# Patient Record
Sex: MALE | Race: WHITE | NOT HISPANIC OR LATINO | Employment: FULL TIME | URBAN - NONMETROPOLITAN AREA
[De-identification: names, ages, dates, MRNs, and addresses within clinical notes are randomized per-mention and may not be internally consistent; named-entity substitution may affect disease eponyms.]

---

## 2017-01-04 ENCOUNTER — CLINICAL SUPPORT (OUTPATIENT)
Dept: PULMONOLOGY | Facility: CLINIC | Age: 57
End: 2017-01-04

## 2017-01-04 DIAGNOSIS — J30.2 SEASONAL ALLERGIC RHINITIS, UNSPECIFIED ALLERGIC RHINITIS TRIGGER: Primary | ICD-10-CM

## 2017-01-04 PROCEDURE — 95115 IMMUNOTHERAPY ONE INJECTION: CPT | Performed by: INTERNAL MEDICINE

## 2017-01-10 ENCOUNTER — CLINICAL SUPPORT (OUTPATIENT)
Dept: PULMONOLOGY | Facility: CLINIC | Age: 57
End: 2017-01-10

## 2017-01-10 DIAGNOSIS — J30.89 ENVIRONMENTAL AND SEASONAL ALLERGIES: Primary | ICD-10-CM

## 2017-01-10 PROCEDURE — 95115 IMMUNOTHERAPY ONE INJECTION: CPT | Performed by: INTERNAL MEDICINE

## 2017-01-23 ENCOUNTER — CLINICAL SUPPORT (OUTPATIENT)
Dept: PULMONOLOGY | Facility: CLINIC | Age: 57
End: 2017-01-23

## 2017-01-23 DIAGNOSIS — J30.89 ENVIRONMENTAL AND SEASONAL ALLERGIES: Primary | ICD-10-CM

## 2017-01-23 PROCEDURE — 95115 IMMUNOTHERAPY ONE INJECTION: CPT | Performed by: INTERNAL MEDICINE

## 2017-02-07 ENCOUNTER — CLINICAL SUPPORT (OUTPATIENT)
Dept: PULMONOLOGY | Facility: CLINIC | Age: 57
End: 2017-02-07

## 2017-02-07 DIAGNOSIS — J30.89 ENVIRONMENTAL AND SEASONAL ALLERGIES: Primary | ICD-10-CM

## 2017-02-07 PROCEDURE — 95115 IMMUNOTHERAPY ONE INJECTION: CPT | Performed by: INTERNAL MEDICINE

## 2017-03-08 ENCOUNTER — CLINICAL SUPPORT (OUTPATIENT)
Dept: PULMONOLOGY | Facility: CLINIC | Age: 57
End: 2017-03-08

## 2017-03-08 DIAGNOSIS — J30.89 ENVIRONMENTAL AND SEASONAL ALLERGIES: Primary | ICD-10-CM

## 2017-03-08 PROCEDURE — 95115 IMMUNOTHERAPY ONE INJECTION: CPT | Performed by: INTERNAL MEDICINE

## 2017-03-14 ENCOUNTER — CLINICAL SUPPORT (OUTPATIENT)
Dept: PULMONOLOGY | Facility: CLINIC | Age: 57
End: 2017-03-14

## 2017-03-14 DIAGNOSIS — J30.2 SEASONAL ALLERGIC RHINITIS, UNSPECIFIED ALLERGIC RHINITIS TRIGGER: Primary | ICD-10-CM

## 2017-03-14 PROCEDURE — 95115 IMMUNOTHERAPY ONE INJECTION: CPT | Performed by: INTERNAL MEDICINE

## 2017-03-23 ENCOUNTER — CLINICAL SUPPORT (OUTPATIENT)
Dept: PULMONOLOGY | Facility: CLINIC | Age: 57
End: 2017-03-23

## 2017-03-23 DIAGNOSIS — J30.89 ENVIRONMENTAL AND SEASONAL ALLERGIES: Primary | ICD-10-CM

## 2017-03-23 PROCEDURE — 95115 IMMUNOTHERAPY ONE INJECTION: CPT | Performed by: INTERNAL MEDICINE

## 2017-03-27 ENCOUNTER — CLINICAL SUPPORT (OUTPATIENT)
Dept: PULMONOLOGY | Facility: CLINIC | Age: 57
End: 2017-03-27

## 2017-03-27 DIAGNOSIS — J30.2 SEASONAL ALLERGIC RHINITIS, UNSPECIFIED ALLERGIC RHINITIS TRIGGER: Primary | ICD-10-CM

## 2017-03-27 PROCEDURE — 95115 IMMUNOTHERAPY ONE INJECTION: CPT | Performed by: INTERNAL MEDICINE

## 2017-04-03 ENCOUNTER — CLINICAL SUPPORT (OUTPATIENT)
Dept: PULMONOLOGY | Facility: CLINIC | Age: 57
End: 2017-04-03

## 2017-04-03 DIAGNOSIS — J30.89 ENVIRONMENTAL AND SEASONAL ALLERGIES: Primary | ICD-10-CM

## 2017-04-03 PROCEDURE — 95115 IMMUNOTHERAPY ONE INJECTION: CPT | Performed by: INTERNAL MEDICINE

## 2017-04-11 ENCOUNTER — CLINICAL SUPPORT (OUTPATIENT)
Dept: PULMONOLOGY | Facility: CLINIC | Age: 57
End: 2017-04-11

## 2017-04-11 DIAGNOSIS — J30.89 ENVIRONMENTAL AND SEASONAL ALLERGIES: Primary | ICD-10-CM

## 2017-04-11 PROCEDURE — 95115 IMMUNOTHERAPY ONE INJECTION: CPT | Performed by: INTERNAL MEDICINE

## 2017-04-17 ENCOUNTER — CLINICAL SUPPORT (OUTPATIENT)
Dept: PULMONOLOGY | Facility: CLINIC | Age: 57
End: 2017-04-17

## 2017-04-17 DIAGNOSIS — J30.2 SEASONAL ALLERGIC RHINITIS, UNSPECIFIED ALLERGIC RHINITIS TRIGGER: Primary | ICD-10-CM

## 2017-04-17 PROCEDURE — 95115 IMMUNOTHERAPY ONE INJECTION: CPT | Performed by: INTERNAL MEDICINE

## 2017-04-26 ENCOUNTER — CLINICAL SUPPORT (OUTPATIENT)
Dept: PULMONOLOGY | Facility: CLINIC | Age: 57
End: 2017-04-26

## 2017-04-26 DIAGNOSIS — J30.2 SEASONAL ALLERGIC RHINITIS, UNSPECIFIED ALLERGIC RHINITIS TRIGGER: Primary | ICD-10-CM

## 2017-04-26 PROCEDURE — 95115 IMMUNOTHERAPY ONE INJECTION: CPT | Performed by: INTERNAL MEDICINE

## 2017-05-03 ENCOUNTER — CLINICAL SUPPORT (OUTPATIENT)
Dept: PULMONOLOGY | Facility: CLINIC | Age: 57
End: 2017-05-03

## 2017-05-03 DIAGNOSIS — J30.89 ENVIRONMENTAL AND SEASONAL ALLERGIES: Primary | ICD-10-CM

## 2017-05-03 PROCEDURE — 95115 IMMUNOTHERAPY ONE INJECTION: CPT | Performed by: INTERNAL MEDICINE

## 2017-05-16 ENCOUNTER — CLINICAL SUPPORT (OUTPATIENT)
Dept: PULMONOLOGY | Facility: CLINIC | Age: 57
End: 2017-05-16

## 2017-05-16 DIAGNOSIS — J30.89 ENVIRONMENTAL AND SEASONAL ALLERGIES: Primary | ICD-10-CM

## 2017-05-16 PROCEDURE — 95115 IMMUNOTHERAPY ONE INJECTION: CPT | Performed by: INTERNAL MEDICINE

## 2017-05-30 ENCOUNTER — CLINICAL SUPPORT (OUTPATIENT)
Dept: PULMONOLOGY | Facility: CLINIC | Age: 57
End: 2017-05-30

## 2017-05-30 DIAGNOSIS — J30.2 SEASONAL ALLERGIC RHINITIS, UNSPECIFIED ALLERGIC RHINITIS TRIGGER: Primary | ICD-10-CM

## 2017-05-30 PROCEDURE — 95115 IMMUNOTHERAPY ONE INJECTION: CPT | Performed by: INTERNAL MEDICINE

## 2017-06-06 ENCOUNTER — CLINICAL SUPPORT (OUTPATIENT)
Dept: PULMONOLOGY | Facility: CLINIC | Age: 57
End: 2017-06-06

## 2017-06-06 DIAGNOSIS — J30.2 SEASONAL ALLERGIC RHINITIS, UNSPECIFIED ALLERGIC RHINITIS TRIGGER: Primary | ICD-10-CM

## 2017-06-06 PROCEDURE — 95115 IMMUNOTHERAPY ONE INJECTION: CPT | Performed by: INTERNAL MEDICINE

## 2017-06-15 ENCOUNTER — CLINICAL SUPPORT (OUTPATIENT)
Dept: PULMONOLOGY | Facility: CLINIC | Age: 57
End: 2017-06-15

## 2017-06-15 DIAGNOSIS — J30.2 SEASONAL ALLERGIC RHINITIS, UNSPECIFIED ALLERGIC RHINITIS TRIGGER: Primary | ICD-10-CM

## 2017-06-15 PROCEDURE — 95115 IMMUNOTHERAPY ONE INJECTION: CPT | Performed by: INTERNAL MEDICINE

## 2017-06-20 ENCOUNTER — CLINICAL SUPPORT (OUTPATIENT)
Dept: INTERNAL MEDICINE | Facility: CLINIC | Age: 57
End: 2017-06-20

## 2017-06-20 DIAGNOSIS — J30.2 SEASONAL ALLERGIC RHINITIS, UNSPECIFIED ALLERGIC RHINITIS TRIGGER: Primary | ICD-10-CM

## 2017-06-20 PROCEDURE — 95115 IMMUNOTHERAPY ONE INJECTION: CPT | Performed by: INTERNAL MEDICINE

## 2017-07-05 ENCOUNTER — CLINICAL SUPPORT (OUTPATIENT)
Dept: PULMONOLOGY | Facility: CLINIC | Age: 57
End: 2017-07-05

## 2017-07-05 DIAGNOSIS — J30.2 SEASONAL ALLERGIC RHINITIS, UNSPECIFIED ALLERGIC RHINITIS TRIGGER: Primary | ICD-10-CM

## 2017-07-05 PROCEDURE — 95115 IMMUNOTHERAPY ONE INJECTION: CPT | Performed by: INTERNAL MEDICINE

## 2017-07-10 ENCOUNTER — CLINICAL SUPPORT (OUTPATIENT)
Dept: INTERNAL MEDICINE | Facility: CLINIC | Age: 57
End: 2017-07-10

## 2017-07-10 DIAGNOSIS — J30.2 SEASONAL ALLERGIC RHINITIS, UNSPECIFIED ALLERGIC RHINITIS TRIGGER: Primary | ICD-10-CM

## 2017-07-10 PROCEDURE — 95115 IMMUNOTHERAPY ONE INJECTION: CPT | Performed by: INTERNAL MEDICINE

## 2017-07-20 ENCOUNTER — CLINICAL SUPPORT (OUTPATIENT)
Dept: INTERNAL MEDICINE | Facility: CLINIC | Age: 57
End: 2017-07-20

## 2017-07-20 DIAGNOSIS — J30.2 SEASONAL ALLERGIC RHINITIS, UNSPECIFIED ALLERGIC RHINITIS TRIGGER: Primary | ICD-10-CM

## 2017-07-20 PROCEDURE — 95115 IMMUNOTHERAPY ONE INJECTION: CPT | Performed by: INTERNAL MEDICINE

## 2017-07-24 ENCOUNTER — CLINICAL SUPPORT (OUTPATIENT)
Dept: PULMONOLOGY | Facility: CLINIC | Age: 57
End: 2017-07-24

## 2017-07-24 DIAGNOSIS — J30.2 SEASONAL ALLERGIC RHINITIS, UNSPECIFIED ALLERGIC RHINITIS TRIGGER: Primary | ICD-10-CM

## 2017-07-24 PROCEDURE — 95115 IMMUNOTHERAPY ONE INJECTION: CPT | Performed by: INTERNAL MEDICINE

## 2017-07-25 ENCOUNTER — CLINICAL SUPPORT (OUTPATIENT)
Dept: PULMONOLOGY | Facility: CLINIC | Age: 57
End: 2017-07-25

## 2017-07-25 DIAGNOSIS — J30.2 SEASONAL ALLERGIC RHINITIS, UNSPECIFIED ALLERGIC RHINITIS TRIGGER: ICD-10-CM

## 2017-07-25 PROCEDURE — 95165 ANTIGEN THERAPY SERVICES: CPT | Performed by: INTERNAL MEDICINE

## 2017-08-01 ENCOUNTER — CLINICAL SUPPORT (OUTPATIENT)
Dept: INTERNAL MEDICINE | Facility: CLINIC | Age: 57
End: 2017-08-01

## 2017-08-01 DIAGNOSIS — J30.2 SEASONAL ALLERGIC RHINITIS, UNSPECIFIED ALLERGIC RHINITIS TRIGGER: Primary | ICD-10-CM

## 2017-08-01 PROCEDURE — 95115 IMMUNOTHERAPY ONE INJECTION: CPT | Performed by: INTERNAL MEDICINE

## 2017-08-09 ENCOUNTER — CLINICAL SUPPORT (OUTPATIENT)
Dept: INTERNAL MEDICINE | Facility: CLINIC | Age: 57
End: 2017-08-09

## 2017-08-09 DIAGNOSIS — J30.2 SEASONAL ALLERGIC RHINITIS, UNSPECIFIED ALLERGIC RHINITIS TRIGGER: Primary | ICD-10-CM

## 2017-08-09 PROCEDURE — 95115 IMMUNOTHERAPY ONE INJECTION: CPT | Performed by: INTERNAL MEDICINE

## 2017-08-14 ENCOUNTER — CLINICAL SUPPORT (OUTPATIENT)
Dept: PULMONOLOGY | Facility: CLINIC | Age: 57
End: 2017-08-14

## 2017-08-14 DIAGNOSIS — J30.2 SEASONAL ALLERGIC RHINITIS, UNSPECIFIED ALLERGIC RHINITIS TRIGGER: Primary | ICD-10-CM

## 2017-08-14 PROCEDURE — 95115 IMMUNOTHERAPY ONE INJECTION: CPT | Performed by: INTERNAL MEDICINE

## 2017-08-22 ENCOUNTER — CLINICAL SUPPORT (OUTPATIENT)
Dept: PULMONOLOGY | Facility: CLINIC | Age: 57
End: 2017-08-22

## 2017-08-22 DIAGNOSIS — J30.2 SEASONAL ALLERGIC RHINITIS, UNSPECIFIED ALLERGIC RHINITIS TRIGGER: Primary | ICD-10-CM

## 2017-08-22 PROCEDURE — 95115 IMMUNOTHERAPY ONE INJECTION: CPT | Performed by: INTERNAL MEDICINE

## 2017-08-28 ENCOUNTER — CLINICAL SUPPORT (OUTPATIENT)
Dept: INTERNAL MEDICINE | Facility: CLINIC | Age: 57
End: 2017-08-28

## 2017-08-28 DIAGNOSIS — J30.2 SEASONAL ALLERGIC RHINITIS, UNSPECIFIED ALLERGIC RHINITIS TRIGGER: Primary | ICD-10-CM

## 2017-08-28 PROCEDURE — 95115 IMMUNOTHERAPY ONE INJECTION: CPT | Performed by: INTERNAL MEDICINE

## 2017-09-05 ENCOUNTER — CLINICAL SUPPORT (OUTPATIENT)
Dept: INTERNAL MEDICINE | Facility: CLINIC | Age: 57
End: 2017-09-05

## 2017-09-05 DIAGNOSIS — J30.2 SEASONAL ALLERGIC RHINITIS, UNSPECIFIED ALLERGIC RHINITIS TRIGGER: Primary | ICD-10-CM

## 2017-09-05 PROCEDURE — 95115 IMMUNOTHERAPY ONE INJECTION: CPT | Performed by: INTERNAL MEDICINE

## 2017-09-12 ENCOUNTER — CLINICAL SUPPORT (OUTPATIENT)
Dept: PULMONOLOGY | Facility: CLINIC | Age: 57
End: 2017-09-12

## 2017-09-12 DIAGNOSIS — J30.2 SEASONAL ALLERGIC RHINITIS, UNSPECIFIED ALLERGIC RHINITIS TRIGGER: Primary | ICD-10-CM

## 2017-09-12 PROCEDURE — 95115 IMMUNOTHERAPY ONE INJECTION: CPT | Performed by: INTERNAL MEDICINE

## 2017-09-20 ENCOUNTER — CLINICAL SUPPORT (OUTPATIENT)
Dept: INTERNAL MEDICINE | Facility: CLINIC | Age: 57
End: 2017-09-20

## 2017-09-20 DIAGNOSIS — J30.2 SEASONAL ALLERGIC RHINITIS, UNSPECIFIED ALLERGIC RHINITIS TRIGGER: Primary | ICD-10-CM

## 2017-09-20 PROCEDURE — 95115 IMMUNOTHERAPY ONE INJECTION: CPT | Performed by: INTERNAL MEDICINE

## 2017-09-28 ENCOUNTER — CLINICAL SUPPORT (OUTPATIENT)
Dept: INTERNAL MEDICINE | Facility: CLINIC | Age: 57
End: 2017-09-28

## 2017-09-28 DIAGNOSIS — J30.2 SEASONAL ALLERGIC RHINITIS, UNSPECIFIED ALLERGIC RHINITIS TRIGGER: Primary | ICD-10-CM

## 2017-09-28 PROCEDURE — 95115 IMMUNOTHERAPY ONE INJECTION: CPT | Performed by: INTERNAL MEDICINE

## 2017-10-04 ENCOUNTER — CLINICAL SUPPORT (OUTPATIENT)
Dept: PULMONOLOGY | Facility: CLINIC | Age: 57
End: 2017-10-04

## 2017-10-04 DIAGNOSIS — J30.2 SEASONAL ALLERGIC RHINITIS, UNSPECIFIED CHRONICITY, UNSPECIFIED TRIGGER: Primary | ICD-10-CM

## 2017-10-04 PROCEDURE — 95115 IMMUNOTHERAPY ONE INJECTION: CPT | Performed by: INTERNAL MEDICINE

## 2017-10-05 ENCOUNTER — CLINICAL SUPPORT (OUTPATIENT)
Dept: PULMONOLOGY | Facility: CLINIC | Age: 57
End: 2017-10-05

## 2017-10-05 DIAGNOSIS — J30.2 SEASONAL ALLERGIC RHINITIS, UNSPECIFIED CHRONICITY, UNSPECIFIED TRIGGER: ICD-10-CM

## 2017-10-05 PROCEDURE — 95165 ANTIGEN THERAPY SERVICES: CPT | Performed by: INTERNAL MEDICINE

## 2017-10-12 ENCOUNTER — CLINICAL SUPPORT (OUTPATIENT)
Dept: PULMONOLOGY | Facility: CLINIC | Age: 57
End: 2017-10-12

## 2017-10-12 DIAGNOSIS — J30.2 SEASONAL ALLERGIC RHINITIS, UNSPECIFIED CHRONICITY, UNSPECIFIED TRIGGER: Primary | ICD-10-CM

## 2017-10-12 PROCEDURE — 95115 IMMUNOTHERAPY ONE INJECTION: CPT | Performed by: INTERNAL MEDICINE

## 2017-10-18 ENCOUNTER — CLINICAL SUPPORT (OUTPATIENT)
Dept: INTERNAL MEDICINE | Facility: CLINIC | Age: 57
End: 2017-10-18

## 2017-10-18 DIAGNOSIS — J30.2 SEASONAL ALLERGIC RHINITIS, UNSPECIFIED CHRONICITY, UNSPECIFIED TRIGGER: Primary | ICD-10-CM

## 2017-10-18 PROCEDURE — 95115 IMMUNOTHERAPY ONE INJECTION: CPT | Performed by: INTERNAL MEDICINE

## 2017-10-30 ENCOUNTER — CLINICAL SUPPORT (OUTPATIENT)
Dept: PULMONOLOGY | Facility: CLINIC | Age: 57
End: 2017-10-30

## 2017-10-30 DIAGNOSIS — J30.2 SEASONAL ALLERGIC RHINITIS, UNSPECIFIED CHRONICITY, UNSPECIFIED TRIGGER: Primary | ICD-10-CM

## 2017-10-30 PROCEDURE — 95115 IMMUNOTHERAPY ONE INJECTION: CPT | Performed by: INTERNAL MEDICINE

## 2017-11-06 ENCOUNTER — CLINICAL SUPPORT (OUTPATIENT)
Dept: PULMONOLOGY | Facility: CLINIC | Age: 57
End: 2017-11-06

## 2017-11-06 DIAGNOSIS — J30.2 SEASONAL ALLERGIC RHINITIS, UNSPECIFIED CHRONICITY, UNSPECIFIED TRIGGER: Primary | ICD-10-CM

## 2017-11-06 PROCEDURE — 95115 IMMUNOTHERAPY ONE INJECTION: CPT | Performed by: INTERNAL MEDICINE

## 2017-11-15 ENCOUNTER — CLINICAL SUPPORT (OUTPATIENT)
Dept: INTERNAL MEDICINE | Facility: CLINIC | Age: 57
End: 2017-11-15

## 2017-11-15 DIAGNOSIS — J30.2 SEASONAL ALLERGIC RHINITIS, UNSPECIFIED CHRONICITY, UNSPECIFIED TRIGGER: Primary | ICD-10-CM

## 2017-11-15 PROCEDURE — 95115 IMMUNOTHERAPY ONE INJECTION: CPT | Performed by: INTERNAL MEDICINE

## 2017-11-20 ENCOUNTER — CLINICAL SUPPORT (OUTPATIENT)
Dept: PULMONOLOGY | Facility: CLINIC | Age: 57
End: 2017-11-20

## 2017-11-20 DIAGNOSIS — J30.2 SEASONAL ALLERGIC RHINITIS, UNSPECIFIED CHRONICITY, UNSPECIFIED TRIGGER: Primary | ICD-10-CM

## 2017-11-20 PROCEDURE — 95115 IMMUNOTHERAPY ONE INJECTION: CPT | Performed by: INTERNAL MEDICINE

## 2017-11-27 ENCOUNTER — CLINICAL SUPPORT (OUTPATIENT)
Dept: PULMONOLOGY | Facility: CLINIC | Age: 57
End: 2017-11-27

## 2017-11-27 DIAGNOSIS — J30.2 SEASONAL ALLERGIC RHINITIS, UNSPECIFIED CHRONICITY, UNSPECIFIED TRIGGER: Primary | ICD-10-CM

## 2017-11-27 PROCEDURE — 95115 IMMUNOTHERAPY ONE INJECTION: CPT | Performed by: INTERNAL MEDICINE

## 2017-12-04 ENCOUNTER — CLINICAL SUPPORT (OUTPATIENT)
Dept: INTERNAL MEDICINE | Facility: CLINIC | Age: 57
End: 2017-12-04

## 2017-12-04 DIAGNOSIS — J30.2 SEASONAL ALLERGIC RHINITIS, UNSPECIFIED CHRONICITY, UNSPECIFIED TRIGGER: Primary | ICD-10-CM

## 2017-12-04 PROCEDURE — 95115 IMMUNOTHERAPY ONE INJECTION: CPT | Performed by: INTERNAL MEDICINE

## 2017-12-11 ENCOUNTER — CLINICAL SUPPORT (OUTPATIENT)
Dept: PULMONOLOGY | Facility: CLINIC | Age: 57
End: 2017-12-11

## 2017-12-11 DIAGNOSIS — J30.2 SEASONAL ALLERGIC RHINITIS, UNSPECIFIED CHRONICITY, UNSPECIFIED TRIGGER: Primary | ICD-10-CM

## 2017-12-11 PROCEDURE — 95115 IMMUNOTHERAPY ONE INJECTION: CPT | Performed by: INTERNAL MEDICINE

## 2017-12-18 ENCOUNTER — CLINICAL SUPPORT (OUTPATIENT)
Dept: PULMONOLOGY | Facility: CLINIC | Age: 57
End: 2017-12-18

## 2017-12-18 DIAGNOSIS — J30.2 SEASONAL ALLERGIC RHINITIS, UNSPECIFIED CHRONICITY, UNSPECIFIED TRIGGER: Primary | ICD-10-CM

## 2017-12-18 PROCEDURE — 95115 IMMUNOTHERAPY ONE INJECTION: CPT | Performed by: INTERNAL MEDICINE

## 2018-01-05 ENCOUNTER — CLINICAL SUPPORT (OUTPATIENT)
Dept: PULMONOLOGY | Facility: CLINIC | Age: 58
End: 2018-01-05

## 2018-01-05 DIAGNOSIS — J30.2 SEASONAL ALLERGIC RHINITIS, UNSPECIFIED CHRONICITY, UNSPECIFIED TRIGGER: Primary | ICD-10-CM

## 2018-01-05 PROCEDURE — 95115 IMMUNOTHERAPY ONE INJECTION: CPT | Performed by: INTERNAL MEDICINE

## 2018-01-08 ENCOUNTER — CLINICAL SUPPORT (OUTPATIENT)
Dept: PULMONOLOGY | Facility: CLINIC | Age: 58
End: 2018-01-08

## 2018-01-08 DIAGNOSIS — J30.2 SEASONAL ALLERGIC RHINITIS, UNSPECIFIED CHRONICITY, UNSPECIFIED TRIGGER: Primary | ICD-10-CM

## 2018-01-08 PROCEDURE — 95115 IMMUNOTHERAPY ONE INJECTION: CPT | Performed by: INTERNAL MEDICINE

## 2018-01-09 ENCOUNTER — CLINICAL SUPPORT (OUTPATIENT)
Dept: PULMONOLOGY | Facility: CLINIC | Age: 58
End: 2018-01-09

## 2018-01-09 DIAGNOSIS — J30.2 SEASONAL ALLERGIC RHINITIS, UNSPECIFIED CHRONICITY, UNSPECIFIED TRIGGER: ICD-10-CM

## 2018-01-09 PROCEDURE — 95165 ANTIGEN THERAPY SERVICES: CPT | Performed by: INTERNAL MEDICINE

## 2018-01-17 ENCOUNTER — CLINICAL SUPPORT (OUTPATIENT)
Dept: PULMONOLOGY | Facility: CLINIC | Age: 58
End: 2018-01-17

## 2018-01-17 DIAGNOSIS — J30.2 SEASONAL ALLERGIC RHINITIS, UNSPECIFIED CHRONICITY, UNSPECIFIED TRIGGER: Primary | ICD-10-CM

## 2018-01-17 PROCEDURE — 95115 IMMUNOTHERAPY ONE INJECTION: CPT | Performed by: INTERNAL MEDICINE

## 2018-01-22 ENCOUNTER — CLINICAL SUPPORT (OUTPATIENT)
Dept: PULMONOLOGY | Facility: CLINIC | Age: 58
End: 2018-01-22

## 2018-01-22 DIAGNOSIS — J30.2 SEASONAL ALLERGIC RHINITIS, UNSPECIFIED CHRONICITY, UNSPECIFIED TRIGGER: Primary | ICD-10-CM

## 2018-01-22 PROCEDURE — 95115 IMMUNOTHERAPY ONE INJECTION: CPT | Performed by: INTERNAL MEDICINE

## 2018-01-29 ENCOUNTER — CLINICAL SUPPORT (OUTPATIENT)
Dept: PULMONOLOGY | Facility: CLINIC | Age: 58
End: 2018-01-29

## 2018-01-29 DIAGNOSIS — J30.2 SEASONAL ALLERGIC RHINITIS, UNSPECIFIED CHRONICITY, UNSPECIFIED TRIGGER: Primary | ICD-10-CM

## 2018-01-29 PROCEDURE — 95115 IMMUNOTHERAPY ONE INJECTION: CPT | Performed by: INTERNAL MEDICINE

## 2018-02-12 ENCOUNTER — CLINICAL SUPPORT (OUTPATIENT)
Dept: PULMONOLOGY | Facility: CLINIC | Age: 58
End: 2018-02-12

## 2018-02-12 DIAGNOSIS — J30.2 SEASONAL ALLERGIC RHINITIS, UNSPECIFIED CHRONICITY, UNSPECIFIED TRIGGER: Primary | ICD-10-CM

## 2018-02-12 PROCEDURE — 95115 IMMUNOTHERAPY ONE INJECTION: CPT | Performed by: INTERNAL MEDICINE

## 2018-02-19 ENCOUNTER — CLINICAL SUPPORT (OUTPATIENT)
Dept: PULMONOLOGY | Facility: CLINIC | Age: 58
End: 2018-02-19

## 2018-02-19 DIAGNOSIS — J30.2 SEASONAL ALLERGIC RHINITIS, UNSPECIFIED CHRONICITY, UNSPECIFIED TRIGGER: Primary | ICD-10-CM

## 2018-02-19 PROCEDURE — 95115 IMMUNOTHERAPY ONE INJECTION: CPT | Performed by: INTERNAL MEDICINE

## 2018-02-26 ENCOUNTER — CLINICAL SUPPORT (OUTPATIENT)
Dept: PULMONOLOGY | Facility: CLINIC | Age: 58
End: 2018-02-26

## 2018-02-26 DIAGNOSIS — J30.2 SEASONAL ALLERGIC RHINITIS, UNSPECIFIED CHRONICITY, UNSPECIFIED TRIGGER: Primary | ICD-10-CM

## 2018-02-26 PROCEDURE — 95115 IMMUNOTHERAPY ONE INJECTION: CPT | Performed by: INTERNAL MEDICINE

## 2018-03-12 ENCOUNTER — CLINICAL SUPPORT (OUTPATIENT)
Dept: PULMONOLOGY | Facility: CLINIC | Age: 58
End: 2018-03-12

## 2018-03-12 DIAGNOSIS — J30.2 SEASONAL ALLERGIC RHINITIS, UNSPECIFIED CHRONICITY, UNSPECIFIED TRIGGER: Primary | ICD-10-CM

## 2018-03-12 PROCEDURE — 95115 IMMUNOTHERAPY ONE INJECTION: CPT | Performed by: INTERNAL MEDICINE

## 2018-03-19 ENCOUNTER — CLINICAL SUPPORT (OUTPATIENT)
Dept: PULMONOLOGY | Facility: CLINIC | Age: 58
End: 2018-03-19

## 2018-03-19 DIAGNOSIS — J30.2 SEASONAL ALLERGIC RHINITIS, UNSPECIFIED CHRONICITY, UNSPECIFIED TRIGGER: Primary | ICD-10-CM

## 2018-03-19 PROCEDURE — 95115 IMMUNOTHERAPY ONE INJECTION: CPT | Performed by: INTERNAL MEDICINE

## 2018-03-30 ENCOUNTER — CLINICAL SUPPORT (OUTPATIENT)
Dept: PULMONOLOGY | Facility: CLINIC | Age: 58
End: 2018-03-30

## 2018-03-30 DIAGNOSIS — J30.2 SEASONAL ALLERGIC RHINITIS, UNSPECIFIED CHRONICITY, UNSPECIFIED TRIGGER: Primary | ICD-10-CM

## 2018-03-30 PROCEDURE — 95115 IMMUNOTHERAPY ONE INJECTION: CPT | Performed by: INTERNAL MEDICINE

## 2018-04-02 ENCOUNTER — APPOINTMENT (OUTPATIENT)
Dept: LAB | Facility: HOSPITAL | Age: 58
End: 2018-04-02

## 2018-04-02 ENCOUNTER — OFFICE VISIT (OUTPATIENT)
Dept: PULMONOLOGY | Facility: CLINIC | Age: 58
End: 2018-04-02

## 2018-04-02 ENCOUNTER — CLINICAL SUPPORT (OUTPATIENT)
Dept: PULMONOLOGY | Facility: CLINIC | Age: 58
End: 2018-04-02

## 2018-04-02 VITALS
HEART RATE: 100 BPM | OXYGEN SATURATION: 97 % | BODY MASS INDEX: 32.58 KG/M2 | SYSTOLIC BLOOD PRESSURE: 143 MMHG | WEIGHT: 215 LBS | HEIGHT: 68 IN | DIASTOLIC BLOOD PRESSURE: 84 MMHG

## 2018-04-02 DIAGNOSIS — J30.2 CHRONIC SEASONAL ALLERGIC RHINITIS, UNSPECIFIED TRIGGER: Primary | ICD-10-CM

## 2018-04-02 DIAGNOSIS — J45.20 MILD INTERMITTENT ASTHMA WITHOUT COMPLICATION: ICD-10-CM

## 2018-04-02 DIAGNOSIS — J30.2 SEASONAL ALLERGIC RHINITIS, UNSPECIFIED CHRONICITY, UNSPECIFIED TRIGGER: Primary | ICD-10-CM

## 2018-04-02 PROCEDURE — 86003 ALLG SPEC IGE CRUDE XTRC EA: CPT | Performed by: INTERNAL MEDICINE

## 2018-04-02 PROCEDURE — 36415 COLL VENOUS BLD VENIPUNCTURE: CPT | Performed by: INTERNAL MEDICINE

## 2018-04-02 PROCEDURE — 95115 IMMUNOTHERAPY ONE INJECTION: CPT | Performed by: INTERNAL MEDICINE

## 2018-04-02 PROCEDURE — 99214 OFFICE O/P EST MOD 30 MIN: CPT | Performed by: INTERNAL MEDICINE

## 2018-04-02 RX ORDER — CLINDAMYCIN HYDROCHLORIDE 300 MG/1
CAPSULE ORAL
COMMUNITY
Start: 2018-03-31 | End: 2019-01-05

## 2018-04-02 RX ORDER — MELOXICAM 15 MG/1
15 TABLET ORAL
COMMUNITY
Start: 2017-12-13 | End: 2018-12-14

## 2018-04-02 NOTE — PROGRESS NOTES
"Gentleman has long-standing allergic rhinitis and intermittent asthma.  He is been taking immunotherapy for number of years.  The last 7 years he is taking shots and done very well.  He is not having much trouble with his breathing now is nasal congestion has improved.ROS    Constitutional-no night sweats weight loss headaches  GI no abdominal pain nausea or diarrhea  Neuro no seizure or neurologic deficits  Musculoskeletal no deformity or joint pain   no dysuria or hematuria  Skin no rash or other lesions  All other systems reviewed and were negative except for the above.      Physical Exam  /84 (BP Location: Left arm, Patient Position: Sitting, Cuff Size: Adult)   Pulse 100   Ht 172.7 cm (68\")   Wt 97.5 kg (215 lb)   SpO2 97%   BMI 32.69 kg/m²   Vital signs as above  Pupils equally round and reactive to light and accommodation, neck no JVD or adenopathy.  Cardiovascular regular rhythm and rate no murmur or gallop.  Abdomen soft no organomegaly tenderness.  Extremities no clubbing cyanosis or edema.  No cervical adenopathy.  No skin rash.  Neurologic good strength bilaterally without deficits  Nose throat and lungs are clear    Impression allergic rhinitis and mild intermittent asthma on immunotherapy    Plan we will redo the is allergy testing and consider whether to continue or discontinue immunotherapy.  He'll return when his present to allergy serum is out          This document has been electronically signed by Freddy Washington MD on April 2, 2018 9:18 AM      "

## 2018-04-06 LAB
A ALTERNATA IGE QN: <0.1 KU/L
A FUMIGATUS IGE QN: <0.1 KU/L
AMER ROACH IGE QN: <0.1 KU/L
BAHIA GRASS IGE QN: 0.39 KU/L
BAYBERRY POLN IGE QN: 0.55 KU/L
BERMUDA GRASS IGE QN: 0.41 KU/L
BOXELDER IGE QN: 0.9 KU/L
C HERBARUM IGE QN: <0.1 KU/L
CAT DANDER IGG QN: <0.1 KU/L
COMMON RAGWEED IGE QN: 0.56 KU/L
CONV CLASS DESCRIPTION: ABNORMAL
D FARINAE IGE QN: 0.1 KU/L
D PTERONYSS IGE QN: <0.1 KU/L
DOG DANDER IGE QN: <0.1 KU/L
DOG FENNEL IGE QN: 0.38 KU/L
ENGL PLANTAIN IGE QN: 0.27 KU/L
GOOSEFOOT IGE QN: 0.19 KU/L
GUM-TREE IGE QN: 0.38 KU/L
ITALIAN CYPRESS IGE QN: <0.1 KU/L
JOHNSON GRASS IGE QN: 0.32 KU/L
M RACEMOSUS IGE QN: <0.1 KU/L
P NOTATUM IGE QN: <0.1 KU/L
PEPPER TREE IGE QN: <0.1 KU/L
PER RYE GRASS IGE QN: 0.93 KU/L
QUEEN PALM IGE QN: 0.15 KU/L
S BOTRYOSUM IGE QN: <0.1 KU/L
SHEEP SORREL IGE QN: 0.37 KU/L
T210-IGE PRIVET, COMMON: <0.1 KU/L
VIRG LIVE OAK IGE QN: 0.37 KU/L
WHITE ELM IGE QN: 0.31 KU/L

## 2018-04-09 ENCOUNTER — CLINICAL SUPPORT (OUTPATIENT)
Dept: PULMONOLOGY | Facility: CLINIC | Age: 58
End: 2018-04-09

## 2018-04-09 DIAGNOSIS — J30.2 SEASONAL ALLERGIC RHINITIS, UNSPECIFIED CHRONICITY, UNSPECIFIED TRIGGER: Primary | ICD-10-CM

## 2018-04-09 PROCEDURE — 95115 IMMUNOTHERAPY ONE INJECTION: CPT | Performed by: INTERNAL MEDICINE

## 2018-04-23 ENCOUNTER — CLINICAL SUPPORT (OUTPATIENT)
Dept: PULMONOLOGY | Facility: CLINIC | Age: 58
End: 2018-04-23

## 2018-04-23 DIAGNOSIS — J30.2 SEASONAL ALLERGIC RHINITIS, UNSPECIFIED CHRONICITY, UNSPECIFIED TRIGGER: Primary | ICD-10-CM

## 2018-04-23 PROCEDURE — 95115 IMMUNOTHERAPY ONE INJECTION: CPT | Performed by: INTERNAL MEDICINE

## 2018-04-27 ENCOUNTER — OFFICE VISIT (OUTPATIENT)
Dept: PULMONOLOGY | Facility: CLINIC | Age: 58
End: 2018-04-27

## 2018-04-27 VITALS
OXYGEN SATURATION: 97 % | WEIGHT: 215 LBS | HEART RATE: 68 BPM | SYSTOLIC BLOOD PRESSURE: 140 MMHG | HEIGHT: 68 IN | DIASTOLIC BLOOD PRESSURE: 82 MMHG | BODY MASS INDEX: 32.58 KG/M2

## 2018-04-27 DIAGNOSIS — J30.9 ALLERGIC RHINITIS, UNSPECIFIED CHRONICITY, UNSPECIFIED SEASONALITY, UNSPECIFIED TRIGGER: Primary | ICD-10-CM

## 2018-04-27 PROCEDURE — 95115 IMMUNOTHERAPY ONE INJECTION: CPT | Performed by: INTERNAL MEDICINE

## 2018-04-27 PROCEDURE — 99213 OFFICE O/P EST LOW 20 MIN: CPT | Performed by: INTERNAL MEDICINE

## 2018-04-27 RX ORDER — PREDNISONE 10 MG/1
TABLET ORAL
Qty: 21 TABLET | Refills: 0 | Status: SHIPPED | OUTPATIENT
Start: 2018-04-27 | End: 2019-01-05

## 2018-04-27 RX ORDER — EPINEPHRINE 0.3 MG/.3ML
INJECTION SUBCUTANEOUS
Qty: 1 EACH | Refills: 1 | Status: SHIPPED | OUTPATIENT
Start: 2018-04-27

## 2018-04-27 NOTE — PROGRESS NOTES
"This gentleman has long-standing allergic rhinitis and continues to have sneezing and itchy eyes and nasal congestion.  He has had systemic allergic reactions after being stung by a wasp also area and he believes immunotherapy is helping    ROS    Constitutional-no night sweats weight loss headaches  GI no abdominal pain nausea or diarrhea  Neuro no seizure or neurologic deficits  Musculoskeletal no deformity or joint pain   no dysuria or hematuria  Skin no rash or other lesions  All other systems reviewed and were negative except for the above.      Physical Exam  /82   Pulse 68   Ht 172.7 cm (68\")   Wt 97.5 kg (215 lb)   SpO2 97%   BMI 32.69 kg/m²   Vital signs as above  Pupils equally round and reactive to light and accommodation, neck no JVD or adenopathy.  Cardiovascular regular rhythm and rate no murmur or gallop.  Abdomen soft no organomegaly tenderness.  Extremities no clubbing cyanosis or edema.  No cervical adenopathy.  No skin rash.  Neurologic good strength bilaterally without deficits  Nose is congested throat and lungs are clear    Impression allergic rhinitis with multiple inhalant allergy to pollens    Plan continue immunotherapy restarting with a new mix at dilution 3, refill medications, return in 4 months          This document has been electronically signed by Freddy Washington MD on April 27, 2018 10:32 AM      "

## 2018-04-30 ENCOUNTER — CLINICAL SUPPORT (OUTPATIENT)
Dept: PULMONOLOGY | Facility: CLINIC | Age: 58
End: 2018-04-30

## 2018-04-30 DIAGNOSIS — J30.2 SEASONAL ALLERGIC RHINITIS, UNSPECIFIED CHRONICITY, UNSPECIFIED TRIGGER: Primary | ICD-10-CM

## 2018-04-30 PROCEDURE — 95115 IMMUNOTHERAPY ONE INJECTION: CPT | Performed by: INTERNAL MEDICINE

## 2018-05-01 ENCOUNTER — CLINICAL SUPPORT (OUTPATIENT)
Dept: PULMONOLOGY | Facility: CLINIC | Age: 58
End: 2018-05-01

## 2018-05-01 DIAGNOSIS — J30.2 SEASONAL ALLERGIC RHINITIS, UNSPECIFIED CHRONICITY, UNSPECIFIED TRIGGER: ICD-10-CM

## 2018-05-01 PROCEDURE — 95165 ANTIGEN THERAPY SERVICES: CPT | Performed by: INTERNAL MEDICINE

## 2018-05-07 ENCOUNTER — CLINICAL SUPPORT (OUTPATIENT)
Dept: PULMONOLOGY | Facility: CLINIC | Age: 58
End: 2018-05-07

## 2018-05-07 DIAGNOSIS — J30.2 SEASONAL ALLERGIC RHINITIS, UNSPECIFIED CHRONICITY, UNSPECIFIED TRIGGER: Primary | ICD-10-CM

## 2018-05-07 PROCEDURE — 95115 IMMUNOTHERAPY ONE INJECTION: CPT | Performed by: INTERNAL MEDICINE

## 2018-05-14 ENCOUNTER — CLINICAL SUPPORT (OUTPATIENT)
Dept: PULMONOLOGY | Facility: CLINIC | Age: 58
End: 2018-05-14

## 2018-05-14 DIAGNOSIS — J30.2 SEASONAL ALLERGIC RHINITIS, UNSPECIFIED CHRONICITY, UNSPECIFIED TRIGGER: Primary | ICD-10-CM

## 2018-05-14 PROCEDURE — 95115 IMMUNOTHERAPY ONE INJECTION: CPT | Performed by: INTERNAL MEDICINE

## 2018-05-21 ENCOUNTER — CLINICAL SUPPORT (OUTPATIENT)
Dept: PULMONOLOGY | Facility: CLINIC | Age: 58
End: 2018-05-21

## 2018-05-21 DIAGNOSIS — J45.20 MILD INTERMITTENT ASTHMA WITHOUT COMPLICATION: ICD-10-CM

## 2018-05-21 DIAGNOSIS — J30.2 SEASONAL ALLERGIC RHINITIS, UNSPECIFIED CHRONICITY, UNSPECIFIED TRIGGER: Primary | ICD-10-CM

## 2018-05-21 DIAGNOSIS — J30.2 SEASONAL ALLERGIES: ICD-10-CM

## 2018-05-21 PROCEDURE — 96372 THER/PROPH/DIAG INJ SC/IM: CPT | Performed by: INTERNAL MEDICINE

## 2018-05-21 PROCEDURE — 95165 ANTIGEN THERAPY SERVICES: CPT | Performed by: INTERNAL MEDICINE

## 2018-05-29 ENCOUNTER — CLINICAL SUPPORT (OUTPATIENT)
Dept: PULMONOLOGY | Facility: CLINIC | Age: 58
End: 2018-05-29

## 2018-05-29 DIAGNOSIS — J30.2 SEASONAL ALLERGIC RHINITIS, UNSPECIFIED CHRONICITY, UNSPECIFIED TRIGGER: Primary | ICD-10-CM

## 2018-05-29 PROCEDURE — 95115 IMMUNOTHERAPY ONE INJECTION: CPT | Performed by: INTERNAL MEDICINE

## 2018-06-04 ENCOUNTER — CLINICAL SUPPORT (OUTPATIENT)
Dept: PULMONOLOGY | Facility: CLINIC | Age: 58
End: 2018-06-04

## 2018-06-04 DIAGNOSIS — J30.2 SEASONAL ALLERGIC RHINITIS, UNSPECIFIED CHRONICITY, UNSPECIFIED TRIGGER: Primary | ICD-10-CM

## 2018-06-04 PROCEDURE — 95115 IMMUNOTHERAPY ONE INJECTION: CPT | Performed by: INTERNAL MEDICINE

## 2018-06-11 ENCOUNTER — CLINICAL SUPPORT (OUTPATIENT)
Dept: PULMONOLOGY | Facility: CLINIC | Age: 58
End: 2018-06-11

## 2018-06-11 DIAGNOSIS — J30.2 SEASONAL ALLERGIC RHINITIS, UNSPECIFIED CHRONICITY, UNSPECIFIED TRIGGER: Primary | ICD-10-CM

## 2018-06-11 PROCEDURE — 95115 IMMUNOTHERAPY ONE INJECTION: CPT | Performed by: INTERNAL MEDICINE

## 2018-06-18 ENCOUNTER — CLINICAL SUPPORT (OUTPATIENT)
Dept: PULMONOLOGY | Facility: CLINIC | Age: 58
End: 2018-06-18

## 2018-06-18 DIAGNOSIS — J30.2 SEASONAL ALLERGIC RHINITIS, UNSPECIFIED CHRONICITY, UNSPECIFIED TRIGGER: Primary | ICD-10-CM

## 2018-06-18 PROCEDURE — 95115 IMMUNOTHERAPY ONE INJECTION: CPT | Performed by: INTERNAL MEDICINE

## 2018-06-25 ENCOUNTER — CLINICAL SUPPORT (OUTPATIENT)
Dept: PULMONOLOGY | Facility: CLINIC | Age: 58
End: 2018-06-25

## 2018-06-25 DIAGNOSIS — J30.2 SEASONAL ALLERGIC RHINITIS, UNSPECIFIED CHRONICITY, UNSPECIFIED TRIGGER: Primary | ICD-10-CM

## 2018-06-25 PROCEDURE — 95115 IMMUNOTHERAPY ONE INJECTION: CPT | Performed by: INTERNAL MEDICINE

## 2018-07-02 ENCOUNTER — CLINICAL SUPPORT (OUTPATIENT)
Dept: PULMONOLOGY | Facility: CLINIC | Age: 58
End: 2018-07-02

## 2018-07-02 DIAGNOSIS — J30.2 SEASONAL ALLERGIC RHINITIS, UNSPECIFIED CHRONICITY, UNSPECIFIED TRIGGER: Primary | ICD-10-CM

## 2018-07-02 PROCEDURE — 95115 IMMUNOTHERAPY ONE INJECTION: CPT | Performed by: INTERNAL MEDICINE

## 2018-07-09 ENCOUNTER — CLINICAL SUPPORT (OUTPATIENT)
Dept: PULMONOLOGY | Facility: CLINIC | Age: 58
End: 2018-07-09

## 2018-07-09 DIAGNOSIS — J30.2 SEASONAL ALLERGIC RHINITIS, UNSPECIFIED CHRONICITY, UNSPECIFIED TRIGGER: Primary | ICD-10-CM

## 2018-07-09 PROCEDURE — 95115 IMMUNOTHERAPY ONE INJECTION: CPT | Performed by: INTERNAL MEDICINE

## 2018-07-16 ENCOUNTER — CLINICAL SUPPORT (OUTPATIENT)
Dept: PULMONOLOGY | Facility: CLINIC | Age: 58
End: 2018-07-16

## 2018-07-16 DIAGNOSIS — J30.2 SEASONAL ALLERGIC RHINITIS, UNSPECIFIED CHRONICITY, UNSPECIFIED TRIGGER: Primary | ICD-10-CM

## 2018-07-16 PROCEDURE — 95115 IMMUNOTHERAPY ONE INJECTION: CPT | Performed by: INTERNAL MEDICINE

## 2018-07-23 ENCOUNTER — CLINICAL SUPPORT (OUTPATIENT)
Dept: PULMONOLOGY | Facility: CLINIC | Age: 58
End: 2018-07-23

## 2018-07-23 DIAGNOSIS — J30.2 SEASONAL ALLERGIC RHINITIS, UNSPECIFIED CHRONICITY, UNSPECIFIED TRIGGER: Primary | ICD-10-CM

## 2018-07-23 PROCEDURE — 95115 IMMUNOTHERAPY ONE INJECTION: CPT | Performed by: INTERNAL MEDICINE

## 2018-07-30 ENCOUNTER — CLINICAL SUPPORT (OUTPATIENT)
Dept: PULMONOLOGY | Facility: CLINIC | Age: 58
End: 2018-07-30

## 2018-07-30 DIAGNOSIS — J30.2 SEASONAL ALLERGIC RHINITIS, UNSPECIFIED CHRONICITY, UNSPECIFIED TRIGGER: Primary | ICD-10-CM

## 2018-07-30 PROCEDURE — 95115 IMMUNOTHERAPY ONE INJECTION: CPT | Performed by: INTERNAL MEDICINE

## 2018-08-06 ENCOUNTER — CLINICAL SUPPORT (OUTPATIENT)
Dept: PULMONOLOGY | Facility: CLINIC | Age: 58
End: 2018-08-06

## 2018-08-06 DIAGNOSIS — J30.1 ALLERGIC RHINITIS DUE TO POLLEN, UNSPECIFIED CHRONICITY, UNSPECIFIED SEASONALITY: Primary | ICD-10-CM

## 2018-08-06 PROCEDURE — 95115 IMMUNOTHERAPY ONE INJECTION: CPT | Performed by: INTERNAL MEDICINE

## 2018-08-13 ENCOUNTER — CLINICAL SUPPORT (OUTPATIENT)
Dept: PULMONOLOGY | Facility: CLINIC | Age: 58
End: 2018-08-13

## 2018-08-13 DIAGNOSIS — J30.2 SEASONAL ALLERGIC RHINITIS, UNSPECIFIED CHRONICITY, UNSPECIFIED TRIGGER: Primary | ICD-10-CM

## 2018-08-13 PROCEDURE — 95115 IMMUNOTHERAPY ONE INJECTION: CPT | Performed by: INTERNAL MEDICINE

## 2018-08-20 ENCOUNTER — CLINICAL SUPPORT (OUTPATIENT)
Dept: PULMONOLOGY | Facility: CLINIC | Age: 58
End: 2018-08-20

## 2018-08-20 DIAGNOSIS — J30.2 SEASONAL ALLERGIC RHINITIS, UNSPECIFIED CHRONICITY, UNSPECIFIED TRIGGER: Primary | ICD-10-CM

## 2018-08-20 PROCEDURE — 95115 IMMUNOTHERAPY ONE INJECTION: CPT | Performed by: INTERNAL MEDICINE

## 2018-08-27 ENCOUNTER — CLINICAL SUPPORT (OUTPATIENT)
Dept: PULMONOLOGY | Facility: CLINIC | Age: 58
End: 2018-08-27

## 2018-08-27 DIAGNOSIS — J30.2 SEASONAL ALLERGIC RHINITIS, UNSPECIFIED CHRONICITY, UNSPECIFIED TRIGGER: Primary | ICD-10-CM

## 2018-08-27 PROCEDURE — 95115 IMMUNOTHERAPY ONE INJECTION: CPT | Performed by: INTERNAL MEDICINE

## 2018-09-10 ENCOUNTER — CLINICAL SUPPORT (OUTPATIENT)
Dept: PULMONOLOGY | Facility: CLINIC | Age: 58
End: 2018-09-10

## 2018-09-10 DIAGNOSIS — J30.2 SEASONAL ALLERGIC RHINITIS, UNSPECIFIED CHRONICITY, UNSPECIFIED TRIGGER: Primary | ICD-10-CM

## 2018-09-10 PROCEDURE — 95115 IMMUNOTHERAPY ONE INJECTION: CPT | Performed by: INTERNAL MEDICINE

## 2018-09-17 ENCOUNTER — CLINICAL SUPPORT (OUTPATIENT)
Dept: PULMONOLOGY | Facility: CLINIC | Age: 58
End: 2018-09-17

## 2018-09-17 DIAGNOSIS — J30.2 SEASONAL ALLERGIC RHINITIS, UNSPECIFIED CHRONICITY, UNSPECIFIED TRIGGER: Primary | ICD-10-CM

## 2018-09-17 PROCEDURE — 95115 IMMUNOTHERAPY ONE INJECTION: CPT | Performed by: INTERNAL MEDICINE

## 2018-09-24 ENCOUNTER — CLINICAL SUPPORT (OUTPATIENT)
Dept: PULMONOLOGY | Facility: CLINIC | Age: 58
End: 2018-09-24

## 2018-09-24 DIAGNOSIS — J30.2 SEASONAL ALLERGIC RHINITIS, UNSPECIFIED TRIGGER: ICD-10-CM

## 2018-09-24 DIAGNOSIS — J30.2 SEASONAL ALLERGIC RHINITIS, UNSPECIFIED CHRONICITY, UNSPECIFIED TRIGGER: Primary | ICD-10-CM

## 2018-09-24 PROCEDURE — 95115 IMMUNOTHERAPY ONE INJECTION: CPT | Performed by: INTERNAL MEDICINE

## 2018-10-01 ENCOUNTER — CLINICAL SUPPORT (OUTPATIENT)
Dept: PULMONOLOGY | Facility: CLINIC | Age: 58
End: 2018-10-01

## 2018-10-01 DIAGNOSIS — J30.2 SEASONAL ALLERGIC RHINITIS, UNSPECIFIED TRIGGER: Primary | ICD-10-CM

## 2018-10-01 PROCEDURE — 95115 IMMUNOTHERAPY ONE INJECTION: CPT | Performed by: INTERNAL MEDICINE

## 2018-10-08 ENCOUNTER — TELEPHONE (OUTPATIENT)
Dept: PULMONOLOGY | Facility: CLINIC | Age: 58
End: 2018-10-08

## 2018-10-08 NOTE — TELEPHONE ENCOUNTER
Left voicemail to let pt know we could not do allergy shot due to Dr Washington will be out of town all week.

## 2018-10-15 ENCOUNTER — CLINICAL SUPPORT (OUTPATIENT)
Dept: PULMONOLOGY | Facility: CLINIC | Age: 58
End: 2018-10-15

## 2018-10-15 DIAGNOSIS — J30.2 SEASONAL ALLERGIC RHINITIS, UNSPECIFIED TRIGGER: Primary | ICD-10-CM

## 2018-10-15 PROCEDURE — 95115 IMMUNOTHERAPY ONE INJECTION: CPT | Performed by: INTERNAL MEDICINE

## 2018-10-29 ENCOUNTER — CLINICAL SUPPORT (OUTPATIENT)
Dept: PULMONOLOGY | Facility: CLINIC | Age: 58
End: 2018-10-29

## 2018-10-29 DIAGNOSIS — J30.2 SEASONAL ALLERGIC RHINITIS, UNSPECIFIED TRIGGER: Primary | ICD-10-CM

## 2018-10-29 PROCEDURE — 95115 IMMUNOTHERAPY ONE INJECTION: CPT | Performed by: INTERNAL MEDICINE

## 2018-11-05 ENCOUNTER — CLINICAL SUPPORT (OUTPATIENT)
Dept: PULMONOLOGY | Facility: CLINIC | Age: 58
End: 2018-11-05

## 2018-11-05 DIAGNOSIS — J30.2 SEASONAL ALLERGIC RHINITIS, UNSPECIFIED TRIGGER: Primary | ICD-10-CM

## 2018-11-05 PROCEDURE — 95115 IMMUNOTHERAPY ONE INJECTION: CPT | Performed by: INTERNAL MEDICINE

## 2018-11-12 ENCOUNTER — CLINICAL SUPPORT (OUTPATIENT)
Dept: PULMONOLOGY | Facility: CLINIC | Age: 58
End: 2018-11-12

## 2018-11-12 DIAGNOSIS — J30.2 SEASONAL ALLERGIC RHINITIS, UNSPECIFIED TRIGGER: Primary | ICD-10-CM

## 2018-11-12 PROCEDURE — 95115 IMMUNOTHERAPY ONE INJECTION: CPT | Performed by: INTERNAL MEDICINE

## 2018-11-19 ENCOUNTER — TELEPHONE (OUTPATIENT)
Dept: PULMONOLOGY | Facility: CLINIC | Age: 58
End: 2018-11-19

## 2018-11-19 NOTE — TELEPHONE ENCOUNTER
Spoke with pt to let him know that [facility] will be out of the office today and that we can not give an allergy injection.

## 2018-11-26 ENCOUNTER — CLINICAL SUPPORT (OUTPATIENT)
Dept: PULMONOLOGY | Facility: CLINIC | Age: 58
End: 2018-11-26

## 2018-11-26 DIAGNOSIS — J30.2 SEASONAL ALLERGIC RHINITIS, UNSPECIFIED TRIGGER: Primary | ICD-10-CM

## 2018-11-26 PROCEDURE — 95115 IMMUNOTHERAPY ONE INJECTION: CPT | Performed by: INTERNAL MEDICINE

## 2018-12-03 ENCOUNTER — CLINICAL SUPPORT (OUTPATIENT)
Dept: PULMONOLOGY | Facility: CLINIC | Age: 58
End: 2018-12-03

## 2018-12-03 DIAGNOSIS — J30.2 SEASONAL ALLERGIC RHINITIS, UNSPECIFIED TRIGGER: Primary | ICD-10-CM

## 2018-12-03 PROCEDURE — 95115 IMMUNOTHERAPY ONE INJECTION: CPT | Performed by: INTERNAL MEDICINE

## 2018-12-11 ENCOUNTER — CLINICAL SUPPORT (OUTPATIENT)
Dept: PULMONOLOGY | Facility: CLINIC | Age: 58
End: 2018-12-11

## 2018-12-11 DIAGNOSIS — J30.2 SEASONAL ALLERGIC RHINITIS, UNSPECIFIED TRIGGER: Primary | ICD-10-CM

## 2018-12-11 PROCEDURE — 95115 IMMUNOTHERAPY ONE INJECTION: CPT | Performed by: INTERNAL MEDICINE

## 2018-12-26 ENCOUNTER — CLINICAL SUPPORT (OUTPATIENT)
Dept: PULMONOLOGY | Facility: CLINIC | Age: 58
End: 2018-12-26

## 2018-12-26 DIAGNOSIS — J30.2 SEASONAL ALLERGIC RHINITIS, UNSPECIFIED TRIGGER: Primary | ICD-10-CM

## 2018-12-26 PROCEDURE — 95115 IMMUNOTHERAPY ONE INJECTION: CPT | Performed by: INTERNAL MEDICINE

## 2018-12-26 NOTE — PROGRESS NOTES
Patient came to office for allergy injection. Patient was given 0.5ml of Dil 1 and the dose was check by Dr. Washington. Patient waited 30 minutes and left office with no reaction.

## 2019-01-07 ENCOUNTER — CLINICAL SUPPORT (OUTPATIENT)
Dept: PULMONOLOGY | Facility: CLINIC | Age: 59
End: 2019-01-07

## 2019-01-07 DIAGNOSIS — J30.2 SEASONAL ALLERGIC RHINITIS, UNSPECIFIED TRIGGER: Primary | ICD-10-CM

## 2019-01-07 PROCEDURE — 95115 IMMUNOTHERAPY ONE INJECTION: CPT | Performed by: INTERNAL MEDICINE

## 2019-01-21 ENCOUNTER — CLINICAL SUPPORT (OUTPATIENT)
Dept: PULMONOLOGY | Facility: CLINIC | Age: 59
End: 2019-01-21

## 2019-01-21 DIAGNOSIS — J30.2 SEASONAL ALLERGIC RHINITIS, UNSPECIFIED TRIGGER: Primary | ICD-10-CM

## 2019-01-21 PROCEDURE — 95115 IMMUNOTHERAPY ONE INJECTION: CPT | Performed by: INTERNAL MEDICINE

## 2019-01-28 ENCOUNTER — CLINICAL SUPPORT (OUTPATIENT)
Dept: PULMONOLOGY | Facility: CLINIC | Age: 59
End: 2019-01-28

## 2019-01-28 DIAGNOSIS — J30.2 SEASONAL ALLERGIC RHINITIS, UNSPECIFIED TRIGGER: Primary | ICD-10-CM

## 2019-02-01 ENCOUNTER — HOSPITAL ENCOUNTER (OUTPATIENT)
Dept: CT IMAGING | Facility: HOSPITAL | Age: 59
Discharge: HOME | End: 2019-02-01
Admitting: NURSE PRACTITIONER

## 2019-02-01 ENCOUNTER — APPOINTMENT (OUTPATIENT)
Dept: LAB | Facility: HOSPITAL | Age: 59
End: 2019-02-01

## 2019-02-01 PROCEDURE — 74176 CT ABD & PELVIS W/O CONTRAST: CPT

## 2019-02-01 PROCEDURE — 85025 COMPLETE CBC W/AUTO DIFF WBC: CPT | Performed by: NURSE PRACTITIONER

## 2019-02-01 PROCEDURE — 80053 COMPREHEN METABOLIC PANEL: CPT | Performed by: NURSE PRACTITIONER

## 2019-09-05 PROCEDURE — 87081 CULTURE SCREEN ONLY: CPT | Performed by: NURSE PRACTITIONER

## 2020-08-08 ENCOUNTER — APPOINTMENT (OUTPATIENT)
Dept: CT IMAGING | Facility: HOSPITAL | Age: 60
End: 2020-08-08

## 2020-08-08 ENCOUNTER — APPOINTMENT (OUTPATIENT)
Dept: GENERAL RADIOLOGY | Facility: HOSPITAL | Age: 60
End: 2020-08-08

## 2020-08-08 ENCOUNTER — HOSPITAL ENCOUNTER (OUTPATIENT)
Facility: HOSPITAL | Age: 60
Discharge: HOME | End: 2020-08-12
Attending: EMERGENCY MEDICINE | Admitting: INTERNAL MEDICINE

## 2020-08-08 DIAGNOSIS — I20.0 UNSTABLE ANGINA (HCC): ICD-10-CM

## 2020-08-08 DIAGNOSIS — R07.9 CHEST PAIN, RULE OUT ACUTE MYOCARDIAL INFARCTION: Primary | ICD-10-CM

## 2020-08-08 PROBLEM — N40.0 BENIGN PROSTATIC HYPERPLASIA WITHOUT LOWER URINARY TRACT SYMPTOMS: Status: ACTIVE | Noted: 2020-08-08

## 2020-08-08 LAB
ALBUMIN SERPL-MCNC: 4.5 G/DL (ref 3.5–5.2)
ALBUMIN/GLOB SERPL: 2 G/DL
ALP SERPL-CCNC: 80 U/L (ref 39–117)
ALT SERPL W P-5'-P-CCNC: 16 U/L (ref 1–41)
ANION GAP SERPL CALCULATED.3IONS-SCNC: 12 MMOL/L (ref 5–15)
AST SERPL-CCNC: 14 U/L (ref 1–40)
BASOPHILS # BLD AUTO: 0.09 10*3/MM3 (ref 0–0.2)
BASOPHILS NFR BLD AUTO: 0.9 % (ref 0–1.5)
BILIRUB SERPL-MCNC: 0.5 MG/DL (ref 0–1.2)
BUN SERPL-MCNC: 12 MG/DL (ref 8–23)
BUN/CREAT SERPL: 12.1 (ref 7–25)
CALCIUM SPEC-SCNC: 9.5 MG/DL (ref 8.6–10.5)
CHLORIDE SERPL-SCNC: 103 MMOL/L (ref 98–107)
CO2 SERPL-SCNC: 25 MMOL/L (ref 22–29)
CREAT SERPL-MCNC: 0.99 MG/DL (ref 0.76–1.27)
D-DIMER, QUANTITATIVE (MAD,POW, STR): 308 NG/ML (FEU) (ref 0–470)
DEPRECATED RDW RBC AUTO: 45.7 FL (ref 37–54)
EOSINOPHIL # BLD AUTO: 0.19 10*3/MM3 (ref 0–0.4)
EOSINOPHIL NFR BLD AUTO: 1.9 % (ref 0.3–6.2)
ERYTHROCYTE [DISTWIDTH] IN BLOOD BY AUTOMATED COUNT: 13.3 % (ref 12.3–15.4)
GFR SERPL CREATININE-BSD FRML MDRD: 77 ML/MIN/1.73
GLOBULIN UR ELPH-MCNC: 2.2 GM/DL
GLUCOSE BLDC GLUCOMTR-MCNC: 123 MG/DL (ref 70–130)
GLUCOSE BLDC GLUCOMTR-MCNC: 193 MG/DL (ref 70–130)
GLUCOSE SERPL-MCNC: 156 MG/DL (ref 65–99)
HCT VFR BLD AUTO: 48.8 % (ref 37.5–51)
HGB BLD-MCNC: 16.9 G/DL (ref 13–17.7)
HOLD SPECIMEN: NORMAL
HOLD SPECIMEN: NORMAL
IMM GRANULOCYTES # BLD AUTO: 0.03 10*3/MM3 (ref 0–0.05)
IMM GRANULOCYTES NFR BLD AUTO: 0.3 % (ref 0–0.5)
LYMPHOCYTES # BLD AUTO: 2.53 10*3/MM3 (ref 0.7–3.1)
LYMPHOCYTES NFR BLD AUTO: 24.9 % (ref 19.6–45.3)
MCH RBC QN AUTO: 32.5 PG (ref 26.6–33)
MCHC RBC AUTO-ENTMCNC: 34.6 G/DL (ref 31.5–35.7)
MCV RBC AUTO: 93.8 FL (ref 79–97)
MONOCYTES # BLD AUTO: 0.62 10*3/MM3 (ref 0.1–0.9)
MONOCYTES NFR BLD AUTO: 6.1 % (ref 5–12)
NEUTROPHILS NFR BLD AUTO: 6.71 10*3/MM3 (ref 1.7–7)
NEUTROPHILS NFR BLD AUTO: 65.9 % (ref 42.7–76)
NRBC BLD AUTO-RTO: 0 /100 WBC (ref 0–0.2)
NT-PROBNP SERPL-MCNC: 110.8 PG/ML (ref 0–900)
PLATELET # BLD AUTO: 307 10*3/MM3 (ref 140–450)
PMV BLD AUTO: 9.3 FL (ref 6–12)
POTASSIUM SERPL-SCNC: 4.1 MMOL/L (ref 3.5–5.2)
PROT SERPL-MCNC: 6.7 G/DL (ref 6–8.5)
RBC # BLD AUTO: 5.2 10*6/MM3 (ref 4.14–5.8)
SARS-COV-2 N GENE RESP QL NAA+PROBE: NOT DETECTED
SODIUM SERPL-SCNC: 140 MMOL/L (ref 136–145)
TROPONIN T SERPL-MCNC: <0.01 NG/ML (ref 0–0.03)
WBC # BLD AUTO: 10.17 10*3/MM3 (ref 3.4–10.8)
WHOLE BLOOD HOLD SPECIMEN: NORMAL
WHOLE BLOOD HOLD SPECIMEN: NORMAL

## 2020-08-08 PROCEDURE — 70498 CT ANGIOGRAPHY NECK: CPT

## 2020-08-08 PROCEDURE — 71045 X-RAY EXAM CHEST 1 VIEW: CPT

## 2020-08-08 PROCEDURE — 93010 ELECTROCARDIOGRAM REPORT: CPT | Performed by: INTERNAL MEDICINE

## 2020-08-08 PROCEDURE — G0378 HOSPITAL OBSERVATION PER HR: HCPCS

## 2020-08-08 PROCEDURE — 99284 EMERGENCY DEPT VISIT MOD MDM: CPT

## 2020-08-08 PROCEDURE — 96374 THER/PROPH/DIAG INJ IV PUSH: CPT

## 2020-08-08 PROCEDURE — 80053 COMPREHEN METABOLIC PANEL: CPT | Performed by: EMERGENCY MEDICINE

## 2020-08-08 PROCEDURE — C9803 HOPD COVID-19 SPEC COLLECT: HCPCS

## 2020-08-08 PROCEDURE — 87635 SARS-COV-2 COVID-19 AMP PRB: CPT | Performed by: EMERGENCY MEDICINE

## 2020-08-08 PROCEDURE — 25010000002 MORPHINE PER 10 MG

## 2020-08-08 PROCEDURE — 85025 COMPLETE CBC W/AUTO DIFF WBC: CPT | Performed by: EMERGENCY MEDICINE

## 2020-08-08 PROCEDURE — 96372 THER/PROPH/DIAG INJ SC/IM: CPT

## 2020-08-08 PROCEDURE — 84484 ASSAY OF TROPONIN QUANT: CPT | Performed by: FAMILY MEDICINE

## 2020-08-08 PROCEDURE — 99219 PR INITIAL OBSERVATION CARE/DAY 50 MINUTES: CPT | Performed by: STUDENT IN AN ORGANIZED HEALTH CARE EDUCATION/TRAINING PROGRAM

## 2020-08-08 PROCEDURE — 85379 FIBRIN DEGRADATION QUANT: CPT | Performed by: EMERGENCY MEDICINE

## 2020-08-08 PROCEDURE — 84484 ASSAY OF TROPONIN QUANT: CPT | Performed by: EMERGENCY MEDICINE

## 2020-08-08 PROCEDURE — 93005 ELECTROCARDIOGRAM TRACING: CPT | Performed by: EMERGENCY MEDICINE

## 2020-08-08 PROCEDURE — 25010000002 ENOXAPARIN PER 10 MG: Performed by: STUDENT IN AN ORGANIZED HEALTH CARE EDUCATION/TRAINING PROGRAM

## 2020-08-08 PROCEDURE — 70496 CT ANGIOGRAPHY HEAD: CPT

## 2020-08-08 PROCEDURE — 83880 ASSAY OF NATRIURETIC PEPTIDE: CPT | Performed by: EMERGENCY MEDICINE

## 2020-08-08 PROCEDURE — 82962 GLUCOSE BLOOD TEST: CPT

## 2020-08-08 PROCEDURE — 0 IOPAMIDOL PER 1 ML: Performed by: EMERGENCY MEDICINE

## 2020-08-08 RX ORDER — FINASTERIDE 5 MG/1
5 TABLET, FILM COATED ORAL DAILY
Status: DISCONTINUED | OUTPATIENT
Start: 2020-08-08 | End: 2020-08-12 | Stop reason: HOSPADM

## 2020-08-08 RX ORDER — ONDANSETRON 2 MG/ML
4 INJECTION INTRAMUSCULAR; INTRAVENOUS EVERY 6 HOURS PRN
Status: DISCONTINUED | OUTPATIENT
Start: 2020-08-08 | End: 2020-08-12 | Stop reason: HOSPADM

## 2020-08-08 RX ORDER — SODIUM CHLORIDE 0.9 % (FLUSH) 0.9 %
10 SYRINGE (ML) INJECTION AS NEEDED
Status: DISCONTINUED | OUTPATIENT
Start: 2020-08-08 | End: 2020-08-12 | Stop reason: HOSPADM

## 2020-08-08 RX ORDER — ACETAMINOPHEN 325 MG/1
650 TABLET ORAL EVERY 4 HOURS PRN
Status: DISCONTINUED | OUTPATIENT
Start: 2020-08-08 | End: 2020-08-11 | Stop reason: SDUPTHER

## 2020-08-08 RX ORDER — ROSUVASTATIN CALCIUM 5 MG/1
5 TABLET, COATED ORAL DAILY
COMMUNITY
End: 2020-08-13 | Stop reason: ALTCHOICE

## 2020-08-08 RX ORDER — ONDANSETRON 2 MG/ML
4 INJECTION INTRAMUSCULAR; INTRAVENOUS ONCE
Status: DISCONTINUED | OUTPATIENT
Start: 2020-08-08 | End: 2020-08-08

## 2020-08-08 RX ORDER — FLUTICASONE PROPIONATE 50 MCG
2 SPRAY, SUSPENSION (ML) NASAL DAILY
Status: DISCONTINUED | OUTPATIENT
Start: 2020-08-08 | End: 2020-08-12 | Stop reason: HOSPADM

## 2020-08-08 RX ORDER — ALBUTEROL SULFATE 90 UG/1
2 AEROSOL, METERED RESPIRATORY (INHALATION) EVERY 6 HOURS PRN
Status: DISCONTINUED | OUTPATIENT
Start: 2020-08-08 | End: 2020-08-12 | Stop reason: HOSPADM

## 2020-08-08 RX ORDER — FAMOTIDINE 40 MG/1
40 TABLET, FILM COATED ORAL
Status: DISCONTINUED | OUTPATIENT
Start: 2020-08-08 | End: 2020-08-12 | Stop reason: HOSPADM

## 2020-08-08 RX ORDER — TRAMADOL HYDROCHLORIDE 50 MG/1
50 TABLET ORAL EVERY 6 HOURS PRN
Status: DISCONTINUED | OUTPATIENT
Start: 2020-08-08 | End: 2020-08-12 | Stop reason: HOSPADM

## 2020-08-08 RX ORDER — ASPIRIN 81 MG/1
324 TABLET, CHEWABLE ORAL ONCE
Status: COMPLETED | OUTPATIENT
Start: 2020-08-08 | End: 2020-08-08

## 2020-08-08 RX ORDER — SILDENAFIL 100 MG/1
50 TABLET, FILM COATED ORAL DAILY PRN
COMMUNITY

## 2020-08-08 RX ORDER — CETIRIZINE HYDROCHLORIDE 10 MG/1
10 TABLET ORAL DAILY
Status: DISCONTINUED | OUTPATIENT
Start: 2020-08-08 | End: 2020-08-12 | Stop reason: HOSPADM

## 2020-08-08 RX ORDER — TRAMADOL HYDROCHLORIDE 50 MG/1
50 TABLET ORAL EVERY 6 HOURS PRN
COMMUNITY

## 2020-08-08 RX ORDER — DOCUSATE SODIUM 100 MG/1
100 CAPSULE, LIQUID FILLED ORAL 2 TIMES DAILY PRN
Status: DISCONTINUED | OUTPATIENT
Start: 2020-08-08 | End: 2020-08-12 | Stop reason: HOSPADM

## 2020-08-08 RX ORDER — ONDANSETRON 4 MG/1
4 TABLET, FILM COATED ORAL EVERY 6 HOURS PRN
Status: DISCONTINUED | OUTPATIENT
Start: 2020-08-08 | End: 2020-08-12 | Stop reason: HOSPADM

## 2020-08-08 RX ORDER — NITROGLYCERIN 0.4 MG/1
0.4 TABLET SUBLINGUAL
Status: DISCONTINUED | OUTPATIENT
Start: 2020-08-08 | End: 2020-08-12 | Stop reason: HOSPADM

## 2020-08-08 RX ORDER — DEXTROSE MONOHYDRATE 25 G/50ML
25 INJECTION, SOLUTION INTRAVENOUS
Status: DISCONTINUED | OUTPATIENT
Start: 2020-08-08 | End: 2020-08-12 | Stop reason: HOSPADM

## 2020-08-08 RX ORDER — SODIUM CHLORIDE 0.9 % (FLUSH) 0.9 %
10 SYRINGE (ML) INJECTION EVERY 12 HOURS SCHEDULED
Status: DISCONTINUED | OUTPATIENT
Start: 2020-08-08 | End: 2020-08-12 | Stop reason: HOSPADM

## 2020-08-08 RX ORDER — NICOTINE POLACRILEX 4 MG
15 LOZENGE BUCCAL
Status: DISCONTINUED | OUTPATIENT
Start: 2020-08-08 | End: 2020-08-12 | Stop reason: HOSPADM

## 2020-08-08 RX ORDER — LANOLIN ALCOHOL/MO/W.PET/CERES
5 CREAM (GRAM) TOPICAL NIGHTLY PRN
Status: DISCONTINUED | OUTPATIENT
Start: 2020-08-08 | End: 2020-08-12 | Stop reason: HOSPADM

## 2020-08-08 RX ORDER — ASPIRIN 81 MG/1
TABLET, CHEWABLE ORAL
Status: COMPLETED
Start: 2020-08-08 | End: 2020-08-08

## 2020-08-08 RX ORDER — MORPHINE SULFATE 2 MG/ML
1 INJECTION, SOLUTION INTRAMUSCULAR; INTRAVENOUS
Status: DISCONTINUED | OUTPATIENT
Start: 2020-08-08 | End: 2020-08-10

## 2020-08-08 RX ORDER — BUSPIRONE HYDROCHLORIDE 5 MG/1
5 TABLET ORAL 2 TIMES DAILY
Status: DISCONTINUED | OUTPATIENT
Start: 2020-08-08 | End: 2020-08-12 | Stop reason: HOSPADM

## 2020-08-08 RX ORDER — ASPIRIN 81 MG/1
81 TABLET, CHEWABLE ORAL DAILY
Status: DISCONTINUED | OUTPATIENT
Start: 2020-08-09 | End: 2020-08-12 | Stop reason: HOSPADM

## 2020-08-08 RX ADMIN — ASPIRIN 324 MG: 81 TABLET, CHEWABLE ORAL at 12:03

## 2020-08-08 RX ADMIN — FAMOTIDINE 40 MG: 40 TABLET, FILM COATED ORAL at 17:41

## 2020-08-08 RX ADMIN — NITROGLYCERIN 1 INCH: 20 OINTMENT TOPICAL at 12:49

## 2020-08-08 RX ADMIN — MORPHINE SULFATE 4 MG: 4 INJECTION, SOLUTION INTRAMUSCULAR; INTRAVENOUS at 12:50

## 2020-08-08 RX ADMIN — CETIRIZINE HYDROCHLORIDE 10 MG: 10 TABLET, FILM COATED ORAL at 17:42

## 2020-08-08 RX ADMIN — ASPIRIN 81 MG 324 MG: 81 TABLET ORAL at 12:03

## 2020-08-08 RX ADMIN — ENOXAPARIN SODIUM 40 MG: 40 INJECTION SUBCUTANEOUS at 17:42

## 2020-08-08 RX ADMIN — BUSPIRONE HYDROCHLORIDE 5 MG: 5 TABLET ORAL at 20:53

## 2020-08-08 RX ADMIN — Medication 4 MG: at 12:50

## 2020-08-08 RX ADMIN — SODIUM CHLORIDE, PRESERVATIVE FREE 10 ML: 5 INJECTION INTRAVENOUS at 20:54

## 2020-08-08 RX ADMIN — ACETAMINOPHEN 650 MG: 325 TABLET, FILM COATED ORAL at 17:42

## 2020-08-08 RX ADMIN — IOPAMIDOL 90 ML: 755 INJECTION, SOLUTION INTRAVENOUS at 13:14

## 2020-08-08 RX ADMIN — FINASTERIDE 5 MG: 5 TABLET, FILM COATED ORAL at 17:41

## 2020-08-09 ENCOUNTER — APPOINTMENT (OUTPATIENT)
Dept: CT IMAGING | Facility: HOSPITAL | Age: 60
End: 2020-08-09

## 2020-08-09 LAB
ANION GAP SERPL CALCULATED.3IONS-SCNC: 13 MMOL/L (ref 5–15)
BASOPHILS # BLD AUTO: 0.11 10*3/MM3 (ref 0–0.2)
BASOPHILS NFR BLD AUTO: 0.9 % (ref 0–1.5)
BUN SERPL-MCNC: 16 MG/DL (ref 8–23)
BUN/CREAT SERPL: 17.6 (ref 7–25)
CALCIUM SPEC-SCNC: 8.9 MG/DL (ref 8.6–10.5)
CHLORIDE SERPL-SCNC: 104 MMOL/L (ref 98–107)
CO2 SERPL-SCNC: 23 MMOL/L (ref 22–29)
CREAT SERPL-MCNC: 0.91 MG/DL (ref 0.76–1.27)
DEPRECATED RDW RBC AUTO: 45.8 FL (ref 37–54)
EOSINOPHIL # BLD AUTO: 0.2 10*3/MM3 (ref 0–0.4)
EOSINOPHIL NFR BLD AUTO: 1.5 % (ref 0.3–6.2)
ERYTHROCYTE [DISTWIDTH] IN BLOOD BY AUTOMATED COUNT: 13.4 % (ref 12.3–15.4)
GFR SERPL CREATININE-BSD FRML MDRD: 85 ML/MIN/1.73
GLUCOSE BLDC GLUCOMTR-MCNC: 150 MG/DL (ref 70–130)
GLUCOSE BLDC GLUCOMTR-MCNC: 214 MG/DL (ref 70–130)
GLUCOSE BLDC GLUCOMTR-MCNC: 234 MG/DL (ref 70–130)
GLUCOSE SERPL-MCNC: 143 MG/DL (ref 65–99)
HCT VFR BLD AUTO: 45.6 % (ref 37.5–51)
HGB BLD-MCNC: 15.6 G/DL (ref 13–17.7)
IMM GRANULOCYTES # BLD AUTO: 0.07 10*3/MM3 (ref 0–0.05)
IMM GRANULOCYTES NFR BLD AUTO: 0.5 % (ref 0–0.5)
LYMPHOCYTES # BLD AUTO: 2.22 10*3/MM3 (ref 0.7–3.1)
LYMPHOCYTES NFR BLD AUTO: 17.2 % (ref 19.6–45.3)
MCH RBC QN AUTO: 32.1 PG (ref 26.6–33)
MCHC RBC AUTO-ENTMCNC: 34.2 G/DL (ref 31.5–35.7)
MCV RBC AUTO: 93.8 FL (ref 79–97)
MONOCYTES # BLD AUTO: 0.83 10*3/MM3 (ref 0.1–0.9)
MONOCYTES NFR BLD AUTO: 6.4 % (ref 5–12)
NEUTROPHILS NFR BLD AUTO: 73.5 % (ref 42.7–76)
NEUTROPHILS NFR BLD AUTO: 9.5 10*3/MM3 (ref 1.7–7)
NRBC BLD AUTO-RTO: 0 /100 WBC (ref 0–0.2)
PLATELET # BLD AUTO: 310 10*3/MM3 (ref 140–450)
PMV BLD AUTO: 9.7 FL (ref 6–12)
POTASSIUM SERPL-SCNC: 3.6 MMOL/L (ref 3.5–5.2)
RBC # BLD AUTO: 4.86 10*6/MM3 (ref 4.14–5.8)
SODIUM SERPL-SCNC: 140 MMOL/L (ref 136–145)
TROPONIN T SERPL-MCNC: <0.01 NG/ML (ref 0–0.03)
WBC # BLD AUTO: 12.93 10*3/MM3 (ref 3.4–10.8)

## 2020-08-09 PROCEDURE — 96376 TX/PRO/DX INJ SAME DRUG ADON: CPT

## 2020-08-09 PROCEDURE — 25010000002 ENOXAPARIN PER 10 MG: Performed by: STUDENT IN AN ORGANIZED HEALTH CARE EDUCATION/TRAINING PROGRAM

## 2020-08-09 PROCEDURE — G0378 HOSPITAL OBSERVATION PER HR: HCPCS

## 2020-08-09 PROCEDURE — 96361 HYDRATE IV INFUSION ADD-ON: CPT

## 2020-08-09 PROCEDURE — 84484 ASSAY OF TROPONIN QUANT: CPT | Performed by: STUDENT IN AN ORGANIZED HEALTH CARE EDUCATION/TRAINING PROGRAM

## 2020-08-09 PROCEDURE — 93005 ELECTROCARDIOGRAM TRACING: CPT | Performed by: STUDENT IN AN ORGANIZED HEALTH CARE EDUCATION/TRAINING PROGRAM

## 2020-08-09 PROCEDURE — 85025 COMPLETE CBC W/AUTO DIFF WBC: CPT | Performed by: STUDENT IN AN ORGANIZED HEALTH CARE EDUCATION/TRAINING PROGRAM

## 2020-08-09 PROCEDURE — 80048 BASIC METABOLIC PNL TOTAL CA: CPT | Performed by: STUDENT IN AN ORGANIZED HEALTH CARE EDUCATION/TRAINING PROGRAM

## 2020-08-09 PROCEDURE — 82962 GLUCOSE BLOOD TEST: CPT

## 2020-08-09 PROCEDURE — 96372 THER/PROPH/DIAG INJ SC/IM: CPT

## 2020-08-09 PROCEDURE — 93010 ELECTROCARDIOGRAM REPORT: CPT | Performed by: INTERNAL MEDICINE

## 2020-08-09 PROCEDURE — 25010000002 MORPHINE PER 10 MG: Performed by: STUDENT IN AN ORGANIZED HEALTH CARE EDUCATION/TRAINING PROGRAM

## 2020-08-09 PROCEDURE — 63710000001 INSULIN ASPART PER 5 UNITS: Performed by: STUDENT IN AN ORGANIZED HEALTH CARE EDUCATION/TRAINING PROGRAM

## 2020-08-09 PROCEDURE — 99225 PR SBSQ OBSERVATION CARE/DAY 25 MINUTES: CPT | Performed by: STUDENT IN AN ORGANIZED HEALTH CARE EDUCATION/TRAINING PROGRAM

## 2020-08-09 RX ORDER — SODIUM CHLORIDE 9 MG/ML
100 INJECTION, SOLUTION INTRAVENOUS CONTINUOUS
Status: DISCONTINUED | OUTPATIENT
Start: 2020-08-09 | End: 2020-08-10

## 2020-08-09 RX ORDER — HYDROXYZINE PAMOATE 25 MG/1
25 CAPSULE ORAL 3 TIMES DAILY PRN
Status: DISCONTINUED | OUTPATIENT
Start: 2020-08-09 | End: 2020-08-12 | Stop reason: HOSPADM

## 2020-08-09 RX ORDER — METOPROLOL SUCCINATE 25 MG
12.5 TABLET, EXTENDED RELEASE 24 HR ORAL
Status: DISCONTINUED | OUTPATIENT
Start: 2020-08-09 | End: 2020-08-09

## 2020-08-09 RX ORDER — LISINOPRIL 5 MG/1
5 TABLET ORAL
Status: DISCONTINUED | OUTPATIENT
Start: 2020-08-09 | End: 2020-08-12 | Stop reason: HOSPADM

## 2020-08-09 RX ADMIN — NITROGLYCERIN 0.4 MG: 0.4 TABLET, ORALLY DISINTEGRATING SUBLINGUAL at 10:05

## 2020-08-09 RX ADMIN — BUSPIRONE HYDROCHLORIDE 5 MG: 5 TABLET ORAL at 08:37

## 2020-08-09 RX ADMIN — METOPROLOL TARTRATE 25 MG: 25 TABLET, FILM COATED ORAL at 21:01

## 2020-08-09 RX ADMIN — FAMOTIDINE 40 MG: 40 TABLET, FILM COATED ORAL at 17:27

## 2020-08-09 RX ADMIN — SODIUM CHLORIDE 100 ML/HR: 9 INJECTION, SOLUTION INTRAVENOUS at 12:12

## 2020-08-09 RX ADMIN — METOPROLOL TARTRATE 5 MG: 5 INJECTION INTRAVENOUS at 09:50

## 2020-08-09 RX ADMIN — MORPHINE SULFATE 1 MG: 2 INJECTION, SOLUTION INTRAMUSCULAR; INTRAVENOUS at 10:08

## 2020-08-09 RX ADMIN — FLUTICASONE PROPIONATE 2 SPRAY: 50 SPRAY, METERED NASAL at 08:40

## 2020-08-09 RX ADMIN — BUSPIRONE HYDROCHLORIDE 5 MG: 5 TABLET ORAL at 21:00

## 2020-08-09 RX ADMIN — CETIRIZINE HYDROCHLORIDE 10 MG: 10 TABLET, FILM COATED ORAL at 08:37

## 2020-08-09 RX ADMIN — MORPHINE SULFATE 1 MG: 2 INJECTION, SOLUTION INTRAMUSCULAR; INTRAVENOUS at 15:21

## 2020-08-09 RX ADMIN — TRAMADOL HYDROCHLORIDE 50 MG: 50 TABLET, FILM COATED ORAL at 17:29

## 2020-08-09 RX ADMIN — METOPROLOL TARTRATE 5 MG: 5 INJECTION INTRAVENOUS at 10:39

## 2020-08-09 RX ADMIN — ENOXAPARIN SODIUM 40 MG: 40 INJECTION SUBCUTANEOUS at 17:28

## 2020-08-09 RX ADMIN — SODIUM CHLORIDE, PRESERVATIVE FREE 10 ML: 5 INJECTION INTRAVENOUS at 21:01

## 2020-08-09 RX ADMIN — FINASTERIDE 5 MG: 5 TABLET, FILM COATED ORAL at 08:37

## 2020-08-09 RX ADMIN — NITROGLYCERIN 0.4 MG: 0.4 TABLET, ORALLY DISINTEGRATING SUBLINGUAL at 08:26

## 2020-08-09 RX ADMIN — NITROGLYCERIN 0.4 MG: 0.4 TABLET, ORALLY DISINTEGRATING SUBLINGUAL at 06:41

## 2020-08-09 RX ADMIN — MORPHINE SULFATE 1 MG: 2 INJECTION, SOLUTION INTRAMUSCULAR; INTRAVENOUS at 08:30

## 2020-08-09 RX ADMIN — INSULIN ASPART 3 UNITS: 100 INJECTION, SOLUTION INTRAVENOUS; SUBCUTANEOUS at 17:27

## 2020-08-09 RX ADMIN — FAMOTIDINE 40 MG: 40 TABLET, FILM COATED ORAL at 08:37

## 2020-08-09 RX ADMIN — LISINOPRIL 5 MG: 5 TABLET ORAL at 12:10

## 2020-08-09 RX ADMIN — MORPHINE SULFATE 1 MG: 2 INJECTION, SOLUTION INTRAMUSCULAR; INTRAVENOUS at 13:02

## 2020-08-09 RX ADMIN — ACETAMINOPHEN 650 MG: 325 TABLET, FILM COATED ORAL at 08:36

## 2020-08-09 RX ADMIN — INSULIN ASPART 3 UNITS: 100 INJECTION, SOLUTION INTRAVENOUS; SUBCUTANEOUS at 12:10

## 2020-08-09 RX ADMIN — HYDROXYZINE PAMOATE 25 MG: 25 CAPSULE ORAL at 12:13

## 2020-08-09 RX ADMIN — NITROGLYCERIN 0.4 MG: 0.4 TABLET, ORALLY DISINTEGRATING SUBLINGUAL at 15:29

## 2020-08-09 RX ADMIN — ASPIRIN 81 MG: 81 TABLET, CHEWABLE ORAL at 08:37

## 2020-08-09 RX ADMIN — NITROGLYCERIN 0.4 MG: 0.4 TABLET, ORALLY DISINTEGRATING SUBLINGUAL at 08:31

## 2020-08-10 ENCOUNTER — APPOINTMENT (OUTPATIENT)
Dept: GENERAL RADIOLOGY | Facility: HOSPITAL | Age: 60
End: 2020-08-10

## 2020-08-10 ENCOUNTER — APPOINTMENT (OUTPATIENT)
Dept: NUCLEAR MEDICINE | Facility: HOSPITAL | Age: 60
End: 2020-08-10

## 2020-08-10 ENCOUNTER — APPOINTMENT (OUTPATIENT)
Dept: CARDIOLOGY | Facility: HOSPITAL | Age: 60
End: 2020-08-10

## 2020-08-10 LAB
ANION GAP SERPL CALCULATED.3IONS-SCNC: 10 MMOL/L (ref 5–15)
BASOPHILS # BLD AUTO: 0.07 10*3/MM3 (ref 0–0.2)
BASOPHILS NFR BLD AUTO: 0.9 % (ref 0–1.5)
BH CV STRESS BP STAGE 1: NORMAL
BH CV STRESS COMMENTS STAGE 1: NORMAL
BH CV STRESS DOSE REGADENOSON STAGE 1: 0.4
BH CV STRESS DURATION MIN STAGE 1: 0
BH CV STRESS DURATION SEC STAGE 1: 10
BH CV STRESS HR STAGE 1: 110
BH CV STRESS PROTOCOL 1: NORMAL
BH CV STRESS RECOVERY BP: NORMAL MMHG
BH CV STRESS RECOVERY HR: 106 BPM
BH CV STRESS STAGE 1: 1
BUN SERPL-MCNC: 15 MG/DL (ref 8–23)
BUN/CREAT SERPL: 19.2 (ref 7–25)
CALCIUM SPEC-SCNC: 8.1 MG/DL (ref 8.6–10.5)
CHLORIDE SERPL-SCNC: 104 MMOL/L (ref 98–107)
CO2 SERPL-SCNC: 24 MMOL/L (ref 22–29)
CREAT SERPL-MCNC: 0.78 MG/DL (ref 0.76–1.27)
DEPRECATED RDW RBC AUTO: 46.1 FL (ref 37–54)
EOSINOPHIL # BLD AUTO: 0.18 10*3/MM3 (ref 0–0.4)
EOSINOPHIL NFR BLD AUTO: 2.3 % (ref 0.3–6.2)
ERYTHROCYTE [DISTWIDTH] IN BLOOD BY AUTOMATED COUNT: 13.4 % (ref 12.3–15.4)
GFR SERPL CREATININE-BSD FRML MDRD: 101 ML/MIN/1.73
GLUCOSE BLDC GLUCOMTR-MCNC: 126 MG/DL (ref 70–130)
GLUCOSE BLDC GLUCOMTR-MCNC: 127 MG/DL (ref 70–130)
GLUCOSE BLDC GLUCOMTR-MCNC: 152 MG/DL (ref 70–130)
GLUCOSE BLDC GLUCOMTR-MCNC: 203 MG/DL (ref 70–130)
GLUCOSE BLDC GLUCOMTR-MCNC: 235 MG/DL (ref 70–130)
GLUCOSE BLDC GLUCOMTR-MCNC: 253 MG/DL (ref 70–130)
GLUCOSE SERPL-MCNC: 148 MG/DL (ref 65–99)
HCT VFR BLD AUTO: 42.2 % (ref 37.5–51)
HGB BLD-MCNC: 14.5 G/DL (ref 13–17.7)
IMM GRANULOCYTES # BLD AUTO: 0.04 10*3/MM3 (ref 0–0.05)
IMM GRANULOCYTES NFR BLD AUTO: 0.5 % (ref 0–0.5)
LV EF NUC BP: 57 %
LYMPHOCYTES # BLD AUTO: 2.35 10*3/MM3 (ref 0.7–3.1)
LYMPHOCYTES NFR BLD AUTO: 30.3 % (ref 19.6–45.3)
MAXIMAL PREDICTED HEART RATE: 159 BPM
MCH RBC QN AUTO: 32.2 PG (ref 26.6–33)
MCHC RBC AUTO-ENTMCNC: 34.4 G/DL (ref 31.5–35.7)
MCV RBC AUTO: 93.8 FL (ref 79–97)
MONOCYTES # BLD AUTO: 0.63 10*3/MM3 (ref 0.1–0.9)
MONOCYTES NFR BLD AUTO: 8.1 % (ref 5–12)
NEUTROPHILS NFR BLD AUTO: 4.48 10*3/MM3 (ref 1.7–7)
NEUTROPHILS NFR BLD AUTO: 57.9 % (ref 42.7–76)
NRBC BLD AUTO-RTO: 0 /100 WBC (ref 0–0.2)
PERCENT MAX PREDICTED HR: 70.44 %
PLATELET # BLD AUTO: 269 10*3/MM3 (ref 140–450)
PMV BLD AUTO: 9.3 FL (ref 6–12)
POTASSIUM SERPL-SCNC: 3.5 MMOL/L (ref 3.5–5.2)
RBC # BLD AUTO: 4.5 10*6/MM3 (ref 4.14–5.8)
SODIUM SERPL-SCNC: 138 MMOL/L (ref 136–145)
STRESS BASELINE BP: NORMAL MMHG
STRESS BASELINE HR: 75 BPM
STRESS PERCENT HR: 83 %
STRESS POST ESTIMATED WORKLOAD: 1 METS
STRESS POST PEAK BP: NORMAL MMHG
STRESS POST PEAK HR: 112 BPM
STRESS TARGET HR: 135 BPM
WBC # BLD AUTO: 7.75 10*3/MM3 (ref 3.4–10.8)

## 2020-08-10 PROCEDURE — G0378 HOSPITAL OBSERVATION PER HR: HCPCS

## 2020-08-10 PROCEDURE — 97162 PT EVAL MOD COMPLEX 30 MIN: CPT

## 2020-08-10 PROCEDURE — 25010000002 MORPHINE PER 10 MG: Performed by: STUDENT IN AN ORGANIZED HEALTH CARE EDUCATION/TRAINING PROGRAM

## 2020-08-10 PROCEDURE — 78452 HT MUSCLE IMAGE SPECT MULT: CPT | Performed by: INTERNAL MEDICINE

## 2020-08-10 PROCEDURE — 0 TECHNETIUM SESTAMIBI: Performed by: STUDENT IN AN ORGANIZED HEALTH CARE EDUCATION/TRAINING PROGRAM

## 2020-08-10 PROCEDURE — 96361 HYDRATE IV INFUSION ADD-ON: CPT

## 2020-08-10 PROCEDURE — A9500 TC99M SESTAMIBI: HCPCS | Performed by: STUDENT IN AN ORGANIZED HEALTH CARE EDUCATION/TRAINING PROGRAM

## 2020-08-10 PROCEDURE — 82962 GLUCOSE BLOOD TEST: CPT

## 2020-08-10 PROCEDURE — 93005 ELECTROCARDIOGRAM TRACING: CPT | Performed by: NURSE PRACTITIONER

## 2020-08-10 PROCEDURE — 93017 CV STRESS TEST TRACING ONLY: CPT

## 2020-08-10 PROCEDURE — 74220 X-RAY XM ESOPHAGUS 1CNTRST: CPT

## 2020-08-10 PROCEDURE — 80048 BASIC METABOLIC PNL TOTAL CA: CPT | Performed by: STUDENT IN AN ORGANIZED HEALTH CARE EDUCATION/TRAINING PROGRAM

## 2020-08-10 PROCEDURE — 93016 CV STRESS TEST SUPVJ ONLY: CPT | Performed by: INTERNAL MEDICINE

## 2020-08-10 PROCEDURE — 99225 PR SBSQ OBSERVATION CARE/DAY 25 MINUTES: CPT | Performed by: STUDENT IN AN ORGANIZED HEALTH CARE EDUCATION/TRAINING PROGRAM

## 2020-08-10 PROCEDURE — 93010 ELECTROCARDIOGRAM REPORT: CPT | Performed by: INTERNAL MEDICINE

## 2020-08-10 PROCEDURE — 25010000002 ENOXAPARIN PER 10 MG: Performed by: NURSE PRACTITIONER

## 2020-08-10 PROCEDURE — 99245 OFF/OP CONSLTJ NEW/EST HI 55: CPT | Performed by: INTERNAL MEDICINE

## 2020-08-10 PROCEDURE — 25010000002 MORPHINE PER 10 MG: Performed by: NURSE PRACTITIONER

## 2020-08-10 PROCEDURE — 25010000002 REGADENOSON 0.4 MG/5ML SOLUTION: Performed by: STUDENT IN AN ORGANIZED HEALTH CARE EDUCATION/TRAINING PROGRAM

## 2020-08-10 PROCEDURE — 93018 CV STRESS TEST I&R ONLY: CPT | Performed by: INTERNAL MEDICINE

## 2020-08-10 PROCEDURE — 85025 COMPLETE CBC W/AUTO DIFF WBC: CPT | Performed by: STUDENT IN AN ORGANIZED HEALTH CARE EDUCATION/TRAINING PROGRAM

## 2020-08-10 PROCEDURE — 63710000001 INSULIN ASPART PER 5 UNITS: Performed by: STUDENT IN AN ORGANIZED HEALTH CARE EDUCATION/TRAINING PROGRAM

## 2020-08-10 PROCEDURE — 78452 HT MUSCLE IMAGE SPECT MULT: CPT

## 2020-08-10 PROCEDURE — 96372 THER/PROPH/DIAG INJ SC/IM: CPT

## 2020-08-10 PROCEDURE — 96376 TX/PRO/DX INJ SAME DRUG ADON: CPT

## 2020-08-10 RX ORDER — SODIUM CHLORIDE 9 MG/ML
75 INJECTION, SOLUTION INTRAVENOUS CONTINUOUS
Status: DISCONTINUED | OUTPATIENT
Start: 2020-08-11 | End: 2020-08-11

## 2020-08-10 RX ORDER — MORPHINE SULFATE 2 MG/ML
2 INJECTION, SOLUTION INTRAMUSCULAR; INTRAVENOUS
Status: DISCONTINUED | OUTPATIENT
Start: 2020-08-10 | End: 2020-08-12 | Stop reason: HOSPADM

## 2020-08-10 RX ORDER — SODIUM CHLORIDE 0.9 % (FLUSH) 0.9 %
10 SYRINGE (ML) INJECTION ONCE
Status: COMPLETED | OUTPATIENT
Start: 2020-08-10 | End: 2020-08-10

## 2020-08-10 RX ORDER — AMLODIPINE BESYLATE 2.5 MG/1
2.5 TABLET ORAL
Status: DISCONTINUED | OUTPATIENT
Start: 2020-08-10 | End: 2020-08-11

## 2020-08-10 RX ADMIN — LISINOPRIL 5 MG: 5 TABLET ORAL at 12:08

## 2020-08-10 RX ADMIN — FLUTICASONE PROPIONATE 2 SPRAY: 50 SPRAY, METERED NASAL at 12:08

## 2020-08-10 RX ADMIN — FAMOTIDINE 40 MG: 40 TABLET, FILM COATED ORAL at 17:35

## 2020-08-10 RX ADMIN — NITROGLYCERIN 0.4 MG: 0.4 TABLET, ORALLY DISINTEGRATING SUBLINGUAL at 14:39

## 2020-08-10 RX ADMIN — REGADENOSON 0.4 MG: 0.08 INJECTION, SOLUTION INTRAVENOUS at 11:33

## 2020-08-10 RX ADMIN — FINASTERIDE 5 MG: 5 TABLET, FILM COATED ORAL at 12:06

## 2020-08-10 RX ADMIN — FAMOTIDINE 40 MG: 40 TABLET, FILM COATED ORAL at 12:06

## 2020-08-10 RX ADMIN — SODIUM CHLORIDE, PRESERVATIVE FREE 10 ML: 5 INJECTION INTRAVENOUS at 11:33

## 2020-08-10 RX ADMIN — BUSPIRONE HYDROCHLORIDE 5 MG: 5 TABLET ORAL at 12:06

## 2020-08-10 RX ADMIN — SODIUM CHLORIDE 100 ML/HR: 9 INJECTION, SOLUTION INTRAVENOUS at 00:02

## 2020-08-10 RX ADMIN — INSULIN ASPART 3 UNITS: 100 INJECTION, SOLUTION INTRAVENOUS; SUBCUTANEOUS at 18:43

## 2020-08-10 RX ADMIN — SODIUM CHLORIDE, PRESERVATIVE FREE 10 ML: 5 INJECTION INTRAVENOUS at 20:49

## 2020-08-10 RX ADMIN — CETIRIZINE HYDROCHLORIDE 10 MG: 10 TABLET, FILM COATED ORAL at 12:06

## 2020-08-10 RX ADMIN — AMLODIPINE BESYLATE 2.5 MG: 2.5 TABLET ORAL at 17:35

## 2020-08-10 RX ADMIN — TECHNETIUM TC 99M SESTAMIBI 1 DOSE: 1 INJECTION INTRAVENOUS at 10:11

## 2020-08-10 RX ADMIN — MORPHINE SULFATE 2 MG: 2 INJECTION, SOLUTION INTRAMUSCULAR; INTRAVENOUS at 20:56

## 2020-08-10 RX ADMIN — SODIUM CHLORIDE, PRESERVATIVE FREE 10 ML: 5 INJECTION INTRAVENOUS at 12:08

## 2020-08-10 RX ADMIN — NITROGLYCERIN 0.4 MG: 0.4 TABLET, ORALLY DISINTEGRATING SUBLINGUAL at 08:24

## 2020-08-10 RX ADMIN — MORPHINE SULFATE 2 MG: 2 INJECTION, SOLUTION INTRAMUSCULAR; INTRAVENOUS at 17:42

## 2020-08-10 RX ADMIN — ENOXAPARIN SODIUM 40 MG: 40 INJECTION SUBCUTANEOUS at 17:35

## 2020-08-10 RX ADMIN — MORPHINE SULFATE 1 MG: 2 INJECTION, SOLUTION INTRAMUSCULAR; INTRAVENOUS at 08:29

## 2020-08-10 RX ADMIN — TECHNETIUM TC 99M SESTAMIBI 1 DOSE: 1 INJECTION INTRAVENOUS at 11:33

## 2020-08-10 RX ADMIN — MORPHINE SULFATE 1 MG: 2 INJECTION, SOLUTION INTRAMUSCULAR; INTRAVENOUS at 06:21

## 2020-08-10 RX ADMIN — NITROGLYCERIN 1 INCH: 20 OINTMENT TOPICAL at 17:34

## 2020-08-10 RX ADMIN — BARIUM SULFATE 120 ML: 960 POWDER, FOR SUSPENSION ORAL at 09:30

## 2020-08-10 RX ADMIN — NITROGLYCERIN 0.4 MG: 0.4 TABLET, ORALLY DISINTEGRATING SUBLINGUAL at 13:09

## 2020-08-10 RX ADMIN — ASPIRIN 81 MG: 81 TABLET, CHEWABLE ORAL at 12:06

## 2020-08-10 RX ADMIN — MORPHINE SULFATE 1 MG: 2 INJECTION, SOLUTION INTRAMUSCULAR; INTRAVENOUS at 13:09

## 2020-08-10 RX ADMIN — BUSPIRONE HYDROCHLORIDE 5 MG: 5 TABLET ORAL at 20:49

## 2020-08-10 RX ADMIN — HYDROXYZINE PAMOATE 25 MG: 25 CAPSULE ORAL at 17:35

## 2020-08-11 ENCOUNTER — APPOINTMENT (OUTPATIENT)
Dept: CARDIOLOGY | Facility: HOSPITAL | Age: 60
End: 2020-08-11

## 2020-08-11 LAB
ANION GAP SERPL CALCULATED.3IONS-SCNC: 11 MMOL/L (ref 5–15)
BASOPHILS # BLD AUTO: 0.06 10*3/MM3 (ref 0–0.2)
BASOPHILS NFR BLD AUTO: 0.7 % (ref 0–1.5)
BH CV ECHO MEAS - ACS: 2.2 CM
BH CV ECHO MEAS - AO ISTHMUS: 3 CM
BH CV ECHO MEAS - AO MAX PG (FULL): 1.2 MMHG
BH CV ECHO MEAS - AO MAX PG: 4.5 MMHG
BH CV ECHO MEAS - AO MEAN PG (FULL): 1 MMHG
BH CV ECHO MEAS - AO MEAN PG: 3 MMHG
BH CV ECHO MEAS - AO ROOT AREA (BSA CORRECTED): 1.7
BH CV ECHO MEAS - AO ROOT AREA: 10.2 CM^2
BH CV ECHO MEAS - AO ROOT DIAM: 3.6 CM
BH CV ECHO MEAS - AO V2 MAX: 106 CM/SEC
BH CV ECHO MEAS - AO V2 MEAN: 74.3 CM/SEC
BH CV ECHO MEAS - AO V2 VTI: 17.7 CM
BH CV ECHO MEAS - ASC AORTA: 3.4 CM
BH CV ECHO MEAS - AVA(I,A): 3.4 CM^2
BH CV ECHO MEAS - AVA(I,D): 3.4 CM^2
BH CV ECHO MEAS - AVA(V,A): 3 CM^2
BH CV ECHO MEAS - AVA(V,D): 3 CM^2
BH CV ECHO MEAS - BSA(HAYCOCK): 2.1 M^2
BH CV ECHO MEAS - BSA: 2.1 M^2
BH CV ECHO MEAS - BZI_BMI: 31 KILOGRAMS/M^2
BH CV ECHO MEAS - BZI_METRIC_HEIGHT: 172.7 CM
BH CV ECHO MEAS - BZI_METRIC_WEIGHT: 92.5 KG
BH CV ECHO MEAS - EDV(CUBED): 115.5 ML
BH CV ECHO MEAS - EDV(MOD-SP2): 77.9 ML
BH CV ECHO MEAS - EDV(MOD-SP4): 90.7 ML
BH CV ECHO MEAS - EDV(TEICH): 111.2 ML
BH CV ECHO MEAS - EF(CUBED): 68 %
BH CV ECHO MEAS - EF(MOD-SP2): 32.9 %
BH CV ECHO MEAS - EF(MOD-SP4): 34.1 %
BH CV ECHO MEAS - EF(TEICH): 59.4 %
BH CV ECHO MEAS - ESV(CUBED): 36.9 ML
BH CV ECHO MEAS - ESV(MOD-SP2): 52.3 ML
BH CV ECHO MEAS - ESV(MOD-SP4): 59.8 ML
BH CV ECHO MEAS - ESV(TEICH): 45.1 ML
BH CV ECHO MEAS - FS: 31.6 %
BH CV ECHO MEAS - IVS/LVPW: 1.3
BH CV ECHO MEAS - IVSD: 1.2 CM
BH CV ECHO MEAS - LA DIMENSION: 3 CM
BH CV ECHO MEAS - LA/AO: 0.83
BH CV ECHO MEAS - LV DIASTOLIC VOL/BSA (35-75): 44 ML/M^2
BH CV ECHO MEAS - LV MASS(C)D: 185.5 GRAMS
BH CV ECHO MEAS - LV MASS(C)DI: 90 GRAMS/M^2
BH CV ECHO MEAS - LV MAX PG: 3.3 MMHG
BH CV ECHO MEAS - LV MEAN PG: 2 MMHG
BH CV ECHO MEAS - LV SYSTOLIC VOL/BSA (12-30): 29 ML/M^2
BH CV ECHO MEAS - LV V1 MAX: 91.4 CM/SEC
BH CV ECHO MEAS - LV V1 MEAN: 57.1 CM/SEC
BH CV ECHO MEAS - LV V1 VTI: 17.4 CM
BH CV ECHO MEAS - LVIDD: 4.9 CM
BH CV ECHO MEAS - LVIDS: 3.3 CM
BH CV ECHO MEAS - LVLD AP2: 9.2 CM
BH CV ECHO MEAS - LVLD AP4: 8.5 CM
BH CV ECHO MEAS - LVLS AP2: 8.2 CM
BH CV ECHO MEAS - LVLS AP4: 8.1 CM
BH CV ECHO MEAS - LVOT AREA (M): 3.5 CM^2
BH CV ECHO MEAS - LVOT AREA: 3.5 CM^2
BH CV ECHO MEAS - LVOT DIAM: 2.1 CM
BH CV ECHO MEAS - LVPWD: 0.92 CM
BH CV ECHO MEAS - MV A MAX VEL: 87 CM/SEC
BH CV ECHO MEAS - MV DEC SLOPE: 575 CM/SEC^2
BH CV ECHO MEAS - MV E MAX VEL: 66.4 CM/SEC
BH CV ECHO MEAS - MV E/A: 0.76
BH CV ECHO MEAS - MV MAX PG: 5.3 MMHG
BH CV ECHO MEAS - MV MEAN PG: 2 MMHG
BH CV ECHO MEAS - MV P1/2T MAX VEL: 85.2 CM/SEC
BH CV ECHO MEAS - MV P1/2T: 43.4 MSEC
BH CV ECHO MEAS - MV V2 MAX: 115 CM/SEC
BH CV ECHO MEAS - MV V2 MEAN: 54.3 CM/SEC
BH CV ECHO MEAS - MV V2 VTI: 26.9 CM
BH CV ECHO MEAS - MVA P1/2T LCG: 2.6 CM^2
BH CV ECHO MEAS - MVA(P1/2T): 5.1 CM^2
BH CV ECHO MEAS - MVA(VTI): 2.2 CM^2
BH CV ECHO MEAS - PA MAX PG: 3.7 MMHG
BH CV ECHO MEAS - PA V2 MAX: 95.6 CM/SEC
BH CV ECHO MEAS - RAP SYSTOLE: 5 MMHG
BH CV ECHO MEAS - RVDD: 1.9 CM
BH CV ECHO MEAS - RVSP: 34.2 MMHG
BH CV ECHO MEAS - SI(AO): 87.4 ML/M^2
BH CV ECHO MEAS - SI(CUBED): 38.1 ML/M^2
BH CV ECHO MEAS - SI(LVOT): 29.2 ML/M^2
BH CV ECHO MEAS - SI(MOD-SP2): 12.4 ML/M^2
BH CV ECHO MEAS - SI(MOD-SP4): 15 ML/M^2
BH CV ECHO MEAS - SI(TEICH): 32.1 ML/M^2
BH CV ECHO MEAS - SV(AO): 180.2 ML
BH CV ECHO MEAS - SV(CUBED): 78.6 ML
BH CV ECHO MEAS - SV(LVOT): 60.3 ML
BH CV ECHO MEAS - SV(MOD-SP2): 25.6 ML
BH CV ECHO MEAS - SV(MOD-SP4): 30.9 ML
BH CV ECHO MEAS - SV(TEICH): 66.1 ML
BH CV ECHO MEAS - TR MAX VEL: 270 CM/SEC
BUN SERPL-MCNC: 17 MG/DL (ref 8–23)
BUN/CREAT SERPL: 19.3 (ref 7–25)
CALCIUM SPEC-SCNC: 8.3 MG/DL (ref 8.6–10.5)
CHLORIDE SERPL-SCNC: 105 MMOL/L (ref 98–107)
CO2 SERPL-SCNC: 22 MMOL/L (ref 22–29)
CREAT SERPL-MCNC: 0.88 MG/DL (ref 0.76–1.27)
DEPRECATED RDW RBC AUTO: 46.2 FL (ref 37–54)
EOSINOPHIL # BLD AUTO: 0.2 10*3/MM3 (ref 0–0.4)
EOSINOPHIL NFR BLD AUTO: 2.2 % (ref 0.3–6.2)
ERYTHROCYTE [DISTWIDTH] IN BLOOD BY AUTOMATED COUNT: 13.2 % (ref 12.3–15.4)
GFR SERPL CREATININE-BSD FRML MDRD: 88 ML/MIN/1.73
GLUCOSE BLDC GLUCOMTR-MCNC: 161 MG/DL (ref 70–130)
GLUCOSE BLDC GLUCOMTR-MCNC: 161 MG/DL (ref 70–130)
GLUCOSE SERPL-MCNC: 167 MG/DL (ref 65–99)
HCT VFR BLD AUTO: 42.2 % (ref 37.5–51)
HGB BLD-MCNC: 14.1 G/DL (ref 13–17.7)
IMM GRANULOCYTES # BLD AUTO: 0.03 10*3/MM3 (ref 0–0.05)
IMM GRANULOCYTES NFR BLD AUTO: 0.3 % (ref 0–0.5)
LYMPHOCYTES # BLD AUTO: 1.91 10*3/MM3 (ref 0.7–3.1)
LYMPHOCYTES NFR BLD AUTO: 21 % (ref 19.6–45.3)
MAXIMAL PREDICTED HEART RATE: 159 BPM
MCH RBC QN AUTO: 31.6 PG (ref 26.6–33)
MCHC RBC AUTO-ENTMCNC: 33.4 G/DL (ref 31.5–35.7)
MCV RBC AUTO: 94.6 FL (ref 79–97)
MONOCYTES # BLD AUTO: 0.66 10*3/MM3 (ref 0.1–0.9)
MONOCYTES NFR BLD AUTO: 7.3 % (ref 5–12)
NEUTROPHILS NFR BLD AUTO: 6.23 10*3/MM3 (ref 1.7–7)
NEUTROPHILS NFR BLD AUTO: 68.5 % (ref 42.7–76)
NRBC BLD AUTO-RTO: 0 /100 WBC (ref 0–0.2)
PLATELET # BLD AUTO: 258 10*3/MM3 (ref 140–450)
PMV BLD AUTO: 9.7 FL (ref 6–12)
POTASSIUM SERPL-SCNC: 4 MMOL/L (ref 3.5–5.2)
RBC # BLD AUTO: 4.46 10*6/MM3 (ref 4.14–5.8)
SODIUM SERPL-SCNC: 138 MMOL/L (ref 136–145)
STRESS TARGET HR: 135 BPM
TROPONIN T SERPL-MCNC: 0.02 NG/ML (ref 0–0.03)
WBC # BLD AUTO: 9.09 10*3/MM3 (ref 3.4–10.8)

## 2020-08-11 PROCEDURE — C1874 STENT, COATED/COV W/DEL SYS: HCPCS | Performed by: INTERNAL MEDICINE

## 2020-08-11 PROCEDURE — 93458 L HRT ARTERY/VENTRICLE ANGIO: CPT | Performed by: INTERNAL MEDICINE

## 2020-08-11 PROCEDURE — 25010000002 FENTANYL CITRATE (PF) 100 MCG/2ML SOLUTION: Performed by: INTERNAL MEDICINE

## 2020-08-11 PROCEDURE — G0378 HOSPITAL OBSERVATION PER HR: HCPCS

## 2020-08-11 PROCEDURE — 25010000002 EPTIFIBATIDE 20 MG/10ML SOLUTION: Performed by: INTERNAL MEDICINE

## 2020-08-11 PROCEDURE — 82962 GLUCOSE BLOOD TEST: CPT

## 2020-08-11 PROCEDURE — 0 IOPAMIDOL PER 1 ML: Performed by: INTERNAL MEDICINE

## 2020-08-11 PROCEDURE — C1725 CATH, TRANSLUMIN NON-LASER: HCPCS | Performed by: INTERNAL MEDICINE

## 2020-08-11 PROCEDURE — 94799 UNLISTED PULMONARY SVC/PX: CPT

## 2020-08-11 PROCEDURE — 85025 COMPLETE CBC W/AUTO DIFF WBC: CPT | Performed by: STUDENT IN AN ORGANIZED HEALTH CARE EDUCATION/TRAINING PROGRAM

## 2020-08-11 PROCEDURE — 93306 TTE W/DOPPLER COMPLETE: CPT | Performed by: INTERNAL MEDICINE

## 2020-08-11 PROCEDURE — 99225 PR SBSQ OBSERVATION CARE/DAY 25 MINUTES: CPT | Performed by: STUDENT IN AN ORGANIZED HEALTH CARE EDUCATION/TRAINING PROGRAM

## 2020-08-11 PROCEDURE — 25010000002 MORPHINE PER 10 MG: Performed by: NURSE PRACTITIONER

## 2020-08-11 PROCEDURE — C1887 CATHETER, GUIDING: HCPCS | Performed by: INTERNAL MEDICINE

## 2020-08-11 PROCEDURE — C1753 CATH, INTRAVAS ULTRASOUND: HCPCS | Performed by: INTERNAL MEDICINE

## 2020-08-11 PROCEDURE — S0260 H&P FOR SURGERY: HCPCS | Performed by: INTERNAL MEDICINE

## 2020-08-11 PROCEDURE — 92978 ENDOLUMINL IVUS OCT C 1ST: CPT | Performed by: INTERNAL MEDICINE

## 2020-08-11 PROCEDURE — 96361 HYDRATE IV INFUSION ADD-ON: CPT

## 2020-08-11 PROCEDURE — 93306 TTE W/DOPPLER COMPLETE: CPT

## 2020-08-11 PROCEDURE — 93005 ELECTROCARDIOGRAM TRACING: CPT | Performed by: INTERNAL MEDICINE

## 2020-08-11 PROCEDURE — C1769 GUIDE WIRE: HCPCS | Performed by: INTERNAL MEDICINE

## 2020-08-11 PROCEDURE — 80048 BASIC METABOLIC PNL TOTAL CA: CPT | Performed by: STUDENT IN AN ORGANIZED HEALTH CARE EDUCATION/TRAINING PROGRAM

## 2020-08-11 PROCEDURE — 96376 TX/PRO/DX INJ SAME DRUG ADON: CPT

## 2020-08-11 PROCEDURE — 99153 MOD SED SAME PHYS/QHP EA: CPT

## 2020-08-11 PROCEDURE — C1894 INTRO/SHEATH, NON-LASER: HCPCS | Performed by: INTERNAL MEDICINE

## 2020-08-11 PROCEDURE — 93010 ELECTROCARDIOGRAM REPORT: CPT | Performed by: INTERNAL MEDICINE

## 2020-08-11 PROCEDURE — 63710000001 INSULIN ASPART PER 5 UNITS: Performed by: INTERNAL MEDICINE

## 2020-08-11 PROCEDURE — 25010000002 HEPARIN (PORCINE) PER 1000 UNITS: Performed by: INTERNAL MEDICINE

## 2020-08-11 PROCEDURE — 99152 MOD SED SAME PHYS/QHP 5/>YRS: CPT

## 2020-08-11 PROCEDURE — 25010000002 MIDAZOLAM PER 1 MG: Performed by: INTERNAL MEDICINE

## 2020-08-11 PROCEDURE — C1725 CATH, TRANSLUMIN NON-LASER: HCPCS

## 2020-08-11 PROCEDURE — C9600 PERC DRUG-EL COR STENT SING: HCPCS | Performed by: INTERNAL MEDICINE

## 2020-08-11 PROCEDURE — 84484 ASSAY OF TROPONIN QUANT: CPT | Performed by: INTERNAL MEDICINE

## 2020-08-11 PROCEDURE — 92928 PRQ TCAT PLMT NTRAC ST 1 LES: CPT | Performed by: INTERNAL MEDICINE

## 2020-08-11 DEVICE — XIENCE SIERRA™ EVEROLIMUS ELUTING CORONARY STENT SYSTEM 2.50 MM X 33 MM / RAPID-EXCHANGE
Type: IMPLANTABLE DEVICE | Status: FUNCTIONAL
Brand: XIENCE SIERRA™

## 2020-08-11 RX ORDER — NITROGLYCERIN 5 MG/ML
INJECTION, SOLUTION INTRAVENOUS AS NEEDED
Status: DISCONTINUED | OUTPATIENT
Start: 2020-08-11 | End: 2020-08-11 | Stop reason: HOSPADM

## 2020-08-11 RX ORDER — EPTIFIBATIDE 2 MG/ML
INJECTION, SOLUTION INTRAVENOUS AS NEEDED
Status: DISCONTINUED | OUTPATIENT
Start: 2020-08-11 | End: 2020-08-11 | Stop reason: HOSPADM

## 2020-08-11 RX ORDER — PRASUGREL 10 MG/1
10 TABLET, FILM COATED ORAL DAILY
Status: DISCONTINUED | OUTPATIENT
Start: 2020-08-12 | End: 2020-08-12 | Stop reason: HOSPADM

## 2020-08-11 RX ORDER — ASPIRIN 81 MG/1
243 TABLET, CHEWABLE ORAL ONCE
Status: COMPLETED | OUTPATIENT
Start: 2020-08-11 | End: 2020-08-11

## 2020-08-11 RX ORDER — PRASUGREL 10 MG/1
TABLET, FILM COATED ORAL AS NEEDED
Status: DISCONTINUED | OUTPATIENT
Start: 2020-08-11 | End: 2020-08-11 | Stop reason: HOSPADM

## 2020-08-11 RX ORDER — MIDAZOLAM HYDROCHLORIDE 1 MG/ML
INJECTION INTRAMUSCULAR; INTRAVENOUS AS NEEDED
Status: DISCONTINUED | OUTPATIENT
Start: 2020-08-11 | End: 2020-08-11 | Stop reason: HOSPADM

## 2020-08-11 RX ORDER — SODIUM CHLORIDE 9 MG/ML
3 INJECTION, SOLUTION INTRAVENOUS CONTINUOUS
Status: DISCONTINUED | OUTPATIENT
Start: 2020-08-11 | End: 2020-08-11

## 2020-08-11 RX ORDER — HEPARIN SODIUM 1000 [USP'U]/ML
INJECTION, SOLUTION INTRAVENOUS; SUBCUTANEOUS AS NEEDED
Status: DISCONTINUED | OUTPATIENT
Start: 2020-08-11 | End: 2020-08-11 | Stop reason: HOSPADM

## 2020-08-11 RX ORDER — ACETAMINOPHEN 325 MG/1
650 TABLET ORAL EVERY 4 HOURS PRN
Status: DISCONTINUED | OUTPATIENT
Start: 2020-08-11 | End: 2020-08-12 | Stop reason: HOSPADM

## 2020-08-11 RX ORDER — FENTANYL CITRATE 50 UG/ML
INJECTION, SOLUTION INTRAMUSCULAR; INTRAVENOUS AS NEEDED
Status: DISCONTINUED | OUTPATIENT
Start: 2020-08-11 | End: 2020-08-11 | Stop reason: HOSPADM

## 2020-08-11 RX ORDER — LIDOCAINE HYDROCHLORIDE 20 MG/ML
INJECTION, SOLUTION INFILTRATION; PERINEURAL AS NEEDED
Status: DISCONTINUED | OUTPATIENT
Start: 2020-08-11 | End: 2020-08-11 | Stop reason: HOSPADM

## 2020-08-11 RX ORDER — ROSUVASTATIN CALCIUM 10 MG/1
10 TABLET, COATED ORAL NIGHTLY
Status: DISCONTINUED | OUTPATIENT
Start: 2020-08-11 | End: 2020-08-12

## 2020-08-11 RX ADMIN — SODIUM CHLORIDE, PRESERVATIVE FREE 10 ML: 5 INJECTION INTRAVENOUS at 20:19

## 2020-08-11 RX ADMIN — CETIRIZINE HYDROCHLORIDE 10 MG: 10 TABLET, FILM COATED ORAL at 07:45

## 2020-08-11 RX ADMIN — ROSUVASTATIN CALCIUM 10 MG: 10 TABLET, FILM COATED ORAL at 20:19

## 2020-08-11 RX ADMIN — AMLODIPINE BESYLATE 2.5 MG: 2.5 TABLET ORAL at 07:46

## 2020-08-11 RX ADMIN — ASPIRIN 81 MG: 81 TABLET, CHEWABLE ORAL at 07:46

## 2020-08-11 RX ADMIN — MORPHINE SULFATE 2 MG: 2 INJECTION, SOLUTION INTRAMUSCULAR; INTRAVENOUS at 07:09

## 2020-08-11 RX ADMIN — LISINOPRIL 5 MG: 5 TABLET ORAL at 07:45

## 2020-08-11 RX ADMIN — FAMOTIDINE 40 MG: 40 TABLET, FILM COATED ORAL at 07:46

## 2020-08-11 RX ADMIN — SODIUM CHLORIDE, PRESERVATIVE FREE 10 ML: 5 INJECTION INTRAVENOUS at 07:45

## 2020-08-11 RX ADMIN — INSULIN ASPART 2 UNITS: 100 INJECTION, SOLUTION INTRAVENOUS; SUBCUTANEOUS at 17:11

## 2020-08-11 RX ADMIN — FINASTERIDE 5 MG: 5 TABLET, FILM COATED ORAL at 07:45

## 2020-08-11 RX ADMIN — NITROGLYCERIN 0.4 MG: 0.4 TABLET, ORALLY DISINTEGRATING SUBLINGUAL at 07:48

## 2020-08-11 RX ADMIN — METOPROLOL TARTRATE 12.5 MG: 25 TABLET, FILM COATED ORAL at 15:41

## 2020-08-11 RX ADMIN — BUSPIRONE HYDROCHLORIDE 5 MG: 5 TABLET ORAL at 20:19

## 2020-08-11 RX ADMIN — ASPIRIN 81 MG 243 MG: 81 TABLET ORAL at 08:25

## 2020-08-11 RX ADMIN — BUSPIRONE HYDROCHLORIDE 5 MG: 5 TABLET ORAL at 07:46

## 2020-08-11 RX ADMIN — SODIUM CHLORIDE 75 ML/HR: 9 INJECTION, SOLUTION INTRAVENOUS at 07:48

## 2020-08-11 RX ADMIN — FAMOTIDINE 40 MG: 40 TABLET, FILM COATED ORAL at 17:13

## 2020-08-12 VITALS
BODY MASS INDEX: 31.04 KG/M2 | HEART RATE: 85 BPM | RESPIRATION RATE: 17 BRPM | TEMPERATURE: 98.8 F | HEIGHT: 68 IN | DIASTOLIC BLOOD PRESSURE: 66 MMHG | OXYGEN SATURATION: 98 % | WEIGHT: 204.81 LBS | SYSTOLIC BLOOD PRESSURE: 146 MMHG

## 2020-08-12 PROBLEM — I24.9 ACUTE CORONARY SYNDROME (HCC): Status: ACTIVE | Noted: 2020-08-12

## 2020-08-12 PROBLEM — I20.0 UNSTABLE ANGINA (HCC): Status: RESOLVED | Noted: 2020-08-08 | Resolved: 2020-08-12

## 2020-08-12 LAB
ANION GAP SERPL CALCULATED.3IONS-SCNC: 11 MMOL/L (ref 5–15)
BASOPHILS # BLD AUTO: 0.05 10*3/MM3 (ref 0–0.2)
BASOPHILS NFR BLD AUTO: 0.6 % (ref 0–1.5)
BUN SERPL-MCNC: 11 MG/DL (ref 8–23)
BUN/CREAT SERPL: 13.6 (ref 7–25)
CALCIUM SPEC-SCNC: 9 MG/DL (ref 8.6–10.5)
CHLORIDE SERPL-SCNC: 103 MMOL/L (ref 98–107)
CO2 SERPL-SCNC: 25 MMOL/L (ref 22–29)
CREAT SERPL-MCNC: 0.81 MG/DL (ref 0.76–1.27)
DEPRECATED RDW RBC AUTO: 45.8 FL (ref 37–54)
EOSINOPHIL # BLD AUTO: 0.23 10*3/MM3 (ref 0–0.4)
EOSINOPHIL NFR BLD AUTO: 2.7 % (ref 0.3–6.2)
ERYTHROCYTE [DISTWIDTH] IN BLOOD BY AUTOMATED COUNT: 13.2 % (ref 12.3–15.4)
GFR SERPL CREATININE-BSD FRML MDRD: 97 ML/MIN/1.73
GLUCOSE SERPL-MCNC: 149 MG/DL (ref 65–99)
HCT VFR BLD AUTO: 45.5 % (ref 37.5–51)
HGB BLD-MCNC: 15.5 G/DL (ref 13–17.7)
IMM GRANULOCYTES # BLD AUTO: 0.03 10*3/MM3 (ref 0–0.05)
IMM GRANULOCYTES NFR BLD AUTO: 0.4 % (ref 0–0.5)
LYMPHOCYTES # BLD AUTO: 1.89 10*3/MM3 (ref 0.7–3.1)
LYMPHOCYTES NFR BLD AUTO: 22.2 % (ref 19.6–45.3)
MCH RBC QN AUTO: 32.1 PG (ref 26.6–33)
MCHC RBC AUTO-ENTMCNC: 34.1 G/DL (ref 31.5–35.7)
MCV RBC AUTO: 94.2 FL (ref 79–97)
MONOCYTES # BLD AUTO: 0.69 10*3/MM3 (ref 0.1–0.9)
MONOCYTES NFR BLD AUTO: 8.1 % (ref 5–12)
NEUTROPHILS NFR BLD AUTO: 5.63 10*3/MM3 (ref 1.7–7)
NEUTROPHILS NFR BLD AUTO: 66 % (ref 42.7–76)
NRBC BLD AUTO-RTO: 0 /100 WBC (ref 0–0.2)
PLATELET # BLD AUTO: 268 10*3/MM3 (ref 140–450)
PMV BLD AUTO: 9.5 FL (ref 6–12)
POTASSIUM SERPL-SCNC: 4 MMOL/L (ref 3.5–5.2)
RBC # BLD AUTO: 4.83 10*6/MM3 (ref 4.14–5.8)
SODIUM SERPL-SCNC: 139 MMOL/L (ref 136–145)
WBC # BLD AUTO: 8.52 10*3/MM3 (ref 3.4–10.8)

## 2020-08-12 PROCEDURE — G0378 HOSPITAL OBSERVATION PER HR: HCPCS

## 2020-08-12 PROCEDURE — 99217 PR OBSERVATION CARE DISCHARGE MANAGEMENT: CPT | Performed by: STUDENT IN AN ORGANIZED HEALTH CARE EDUCATION/TRAINING PROGRAM

## 2020-08-12 PROCEDURE — 93010 ELECTROCARDIOGRAM REPORT: CPT | Performed by: INTERNAL MEDICINE

## 2020-08-12 PROCEDURE — 99226 PR SBSQ OBSERVATION CARE/DAY 35 MINUTES: CPT | Performed by: INTERNAL MEDICINE

## 2020-08-12 PROCEDURE — 85025 COMPLETE CBC W/AUTO DIFF WBC: CPT | Performed by: INTERNAL MEDICINE

## 2020-08-12 PROCEDURE — 93005 ELECTROCARDIOGRAM TRACING: CPT | Performed by: INTERNAL MEDICINE

## 2020-08-12 PROCEDURE — 80048 BASIC METABOLIC PNL TOTAL CA: CPT | Performed by: INTERNAL MEDICINE

## 2020-08-12 RX ORDER — CHOLECALCIFEROL (VITAMIN D3) 125 MCG
5 CAPSULE ORAL NIGHTLY PRN
Qty: 30 EACH | Refills: 1 | Status: SHIPPED | OUTPATIENT
Start: 2020-08-12

## 2020-08-12 RX ORDER — LISINOPRIL 5 MG/1
5 TABLET ORAL
Qty: 30 TABLET | Refills: 1 | Status: SHIPPED | OUTPATIENT
Start: 2020-08-13

## 2020-08-12 RX ORDER — ROSUVASTATIN CALCIUM 10 MG/1
20 TABLET, COATED ORAL NIGHTLY
Status: DISCONTINUED | OUTPATIENT
Start: 2020-08-12 | End: 2020-08-12 | Stop reason: HOSPADM

## 2020-08-12 RX ORDER — METOPROLOL SUCCINATE 25 MG/1
25 TABLET, EXTENDED RELEASE ORAL
Status: DISCONTINUED | OUTPATIENT
Start: 2020-08-12 | End: 2020-08-12 | Stop reason: HOSPADM

## 2020-08-12 RX ORDER — METOPROLOL SUCCINATE 25 MG/1
25 TABLET, EXTENDED RELEASE ORAL
Qty: 30 TABLET | Refills: 1 | Status: SHIPPED | OUTPATIENT
Start: 2020-08-13

## 2020-08-12 RX ORDER — ASPIRIN 81 MG/1
81 TABLET, CHEWABLE ORAL DAILY
Qty: 30 TABLET | Refills: 1 | Status: SHIPPED | OUTPATIENT
Start: 2020-08-13 | End: 2021-09-24

## 2020-08-12 RX ORDER — PRASUGREL 10 MG/1
10 TABLET, FILM COATED ORAL DAILY
Qty: 30 TABLET | Refills: 1 | Status: SHIPPED | OUTPATIENT
Start: 2020-08-13

## 2020-08-12 RX ADMIN — BUSPIRONE HYDROCHLORIDE 5 MG: 5 TABLET ORAL at 08:01

## 2020-08-12 RX ADMIN — METOPROLOL TARTRATE 12.5 MG: 25 TABLET, FILM COATED ORAL at 08:02

## 2020-08-12 RX ADMIN — LISINOPRIL 5 MG: 5 TABLET ORAL at 08:01

## 2020-08-12 RX ADMIN — ASPIRIN 81 MG: 81 TABLET, CHEWABLE ORAL at 08:02

## 2020-08-12 RX ADMIN — SODIUM CHLORIDE, PRESERVATIVE FREE 10 ML: 5 INJECTION INTRAVENOUS at 08:03

## 2020-08-12 RX ADMIN — PRASUGREL 10 MG: 10 TABLET, FILM COATED ORAL at 08:02

## 2020-08-12 RX ADMIN — CETIRIZINE HYDROCHLORIDE 10 MG: 10 TABLET, FILM COATED ORAL at 08:01

## 2020-08-12 RX ADMIN — FINASTERIDE 5 MG: 5 TABLET, FILM COATED ORAL at 08:01

## 2020-08-12 RX ADMIN — FAMOTIDINE 40 MG: 40 TABLET, FILM COATED ORAL at 06:39

## 2020-08-13 ENCOUNTER — READMISSION MANAGEMENT (OUTPATIENT)
Dept: CALL CENTER | Facility: HOSPITAL | Age: 60
End: 2020-08-13

## 2020-08-13 ENCOUNTER — TELEPHONE (OUTPATIENT)
Dept: FAMILY MEDICINE CLINIC | Facility: CLINIC | Age: 60
End: 2020-08-13

## 2020-08-13 ENCOUNTER — PATIENT OUTREACH (OUTPATIENT)
Dept: FAMILY MEDICINE CLINIC | Facility: CLINIC | Age: 60
End: 2020-08-13

## 2020-08-13 DIAGNOSIS — E78.2 MIXED HYPERLIPIDEMIA: Primary | ICD-10-CM

## 2020-08-13 RX ORDER — ROSUVASTATIN CALCIUM 20 MG/1
20 TABLET, COATED ORAL DAILY
Qty: 30 TABLET | Refills: 3 | Status: SHIPPED | OUTPATIENT
Start: 2020-08-13 | End: 2020-10-05

## 2020-08-13 NOTE — OUTREACH NOTE
Prep Survey      Responses   [facility] facility patient discharged from?  [place]   Is LACE score < 7 ?  No   Eligibility  Readm Mgmt   Discharge diagnosis    Unstable angina    Does the patient have one of the following disease processes/diagnoses(primary or secondary)?  Other   Does the patient have Home health ordered?  No   Is there a DME ordered?  No   Medication alerts for this patient  ASA, Effient    General alerts for this patient  Heart Cath    Prep survey completed?  Yes          Franca Robison RN

## 2020-08-13 NOTE — PROGRESS NOTES
Verified pharmacy had correct prescription for Crestor 20 mg.  Crestor 20 was filled and sent home with the patient.

## 2020-08-13 NOTE — TELEPHONE ENCOUNTER
[facility] CALLED AND IS NEEDING CLARIFICATION ON THE MEDICATION rosuvastatin (CRESTOR) 5 MG tablet. SHE SAID THAT THE WIFE TOLD THEM THAT SHE WAS UNDER THE IMPRESSION THAT DR YU WAS GOING TO INCREASE THIS FROM 5 MG TO 20 MG. WHEN HE SENT THE MEDICATIONS TO THE PHARMACY, THAT NEVER GOT INCREASED. THEY WOULD LIKE A CALL BACK PLEASE -[phone].        THANK YOU,      CARLOS

## 2020-08-13 NOTE — OUTREACH NOTE
Patient was missing crestor 20 mg from discharge summary.  Spoke to discharging physician Dr. Montejo.  Cardiology does want increased dose on board.  Provider wanted verbal sent in to pharmacy for crestor 20 mg and discontinue 5 mg Crestor.  Done to [facility] on behalf of provider

## 2020-08-13 NOTE — TELEPHONE ENCOUNTER
[facility] CALLED AND IS NEEDING CLARIFICATION ON THE MEDICATION rosuvastatin (CRESTOR) 5 MG tablet. SHE SAID THAT THE WIFE TOLD THEM THAT SHE WAS UNDER THE IMPRESSION THAT DR YU WAS GOING TO INCREASE THIS FROM 5 MG TO 20 MG. WHEN HE SENT THE MEDICATIONS TO THE PHARMACY, THAT NEVER GOT INCREASED. THEY WOULD LIKE A CALL BACK PLEASE [phone].           THANK YOU,        CARLOS

## 2020-08-14 ENCOUNTER — PATIENT OUTREACH (OUTPATIENT)
Dept: FAMILY MEDICINE CLINIC | Facility: CLINIC | Age: 60
End: 2020-08-14

## 2020-08-14 NOTE — OUTREACH NOTE
Spoke to patient's pharmacy.  Patient had the incorrect dose of a statin (5 mg) and needed a new prescription.  Spoke to patient's discharging provider, Dr. Montejo, and she was able to get a prescription for 20 mg of crestor sent into the pharmacy.  Pharmacy confirmed that patient had picked up all other medications.    Unable to reach patient after two attempts.

## 2020-08-17 ENCOUNTER — DOCUMENTATION (OUTPATIENT)
Dept: CARDIOLOGY | Facility: CLINIC | Age: 60
End: 2020-08-17

## 2020-08-17 NOTE — PROGRESS NOTES
Received [facility] paperwork for patient to be off of work from the date of procedure (8/11) until 8/19. Filled out and called pt to let them know it was ready to be picked up.    Addendum: Made a copy of signed paperwork and sent it to medical records.

## 2020-08-25 ENCOUNTER — DOCUMENTATION (OUTPATIENT)
Dept: CARDIAC REHAB | Facility: HOSPITAL | Age: 60
End: 2020-08-25

## 2020-09-04 ENCOUNTER — DOCUMENTATION (OUTPATIENT)
Dept: CARDIAC REHAB | Facility: HOSPITAL | Age: 60
End: 2020-09-04

## 2020-09-04 ENCOUNTER — TRANSCRIBE ORDERS (OUTPATIENT)
Dept: CARDIOLOGY | Facility: CLINIC | Age: 60
End: 2020-09-04

## 2020-09-04 DIAGNOSIS — Z98.61 POST PTCA: Primary | ICD-10-CM

## 2020-09-14 ENCOUNTER — HOSPITAL ENCOUNTER (OUTPATIENT)
Dept: CARDIAC REHAB | Facility: HOSPITAL | Age: 60
Setting detail: THERAPIES SERIES
Discharge: HOME | End: 2020-09-14

## 2020-09-14 VITALS
WEIGHT: 201 LBS | SYSTOLIC BLOOD PRESSURE: 129 MMHG | BODY MASS INDEX: 30.46 KG/M2 | HEART RATE: 86 BPM | DIASTOLIC BLOOD PRESSURE: 66 MMHG

## 2020-09-14 DIAGNOSIS — Z98.61 POST PTCA: Primary | ICD-10-CM

## 2020-09-14 PROCEDURE — 93798 PHYS/QHP OP CAR RHAB W/ECG: CPT

## 2020-09-16 ENCOUNTER — HOSPITAL ENCOUNTER (OUTPATIENT)
Dept: CARDIAC REHAB | Facility: HOSPITAL | Age: 60
Setting detail: THERAPIES SERIES
Discharge: HOME | End: 2020-09-16

## 2020-09-16 VITALS — SYSTOLIC BLOOD PRESSURE: 138 MMHG | DIASTOLIC BLOOD PRESSURE: 60 MMHG | HEART RATE: 89 BPM

## 2020-09-16 DIAGNOSIS — Z98.61 POST PTCA: Primary | ICD-10-CM

## 2020-09-16 PROCEDURE — 93798 PHYS/QHP OP CAR RHAB W/ECG: CPT

## 2020-09-21 ENCOUNTER — HOSPITAL ENCOUNTER (OUTPATIENT)
Dept: CARDIAC REHAB | Facility: HOSPITAL | Age: 60
Setting detail: THERAPIES SERIES
Discharge: HOME | End: 2020-09-21

## 2020-09-21 VITALS
BODY MASS INDEX: 30.77 KG/M2 | SYSTOLIC BLOOD PRESSURE: 131 MMHG | HEIGHT: 68 IN | WEIGHT: 203 LBS | HEART RATE: 88 BPM | DIASTOLIC BLOOD PRESSURE: 60 MMHG

## 2020-09-21 DIAGNOSIS — Z98.61 POST PTCA: Primary | ICD-10-CM

## 2020-09-21 PROCEDURE — 93798 PHYS/QHP OP CAR RHAB W/ECG: CPT

## 2020-09-23 ENCOUNTER — HOSPITAL ENCOUNTER (OUTPATIENT)
Dept: CARDIAC REHAB | Facility: HOSPITAL | Age: 60
Setting detail: THERAPIES SERIES
Discharge: HOME | End: 2020-09-23

## 2020-09-23 VITALS — HEART RATE: 89 BPM | SYSTOLIC BLOOD PRESSURE: 123 MMHG | DIASTOLIC BLOOD PRESSURE: 56 MMHG

## 2020-09-23 DIAGNOSIS — Z98.61 POST PTCA: Primary | ICD-10-CM

## 2020-09-23 PROCEDURE — 93798 PHYS/QHP OP CAR RHAB W/ECG: CPT

## 2020-09-28 ENCOUNTER — APPOINTMENT (OUTPATIENT)
Dept: CARDIAC REHAB | Facility: HOSPITAL | Age: 60
End: 2020-09-28

## 2020-09-28 ENCOUNTER — HOSPITAL ENCOUNTER (OUTPATIENT)
Dept: CARDIAC REHAB | Facility: HOSPITAL | Age: 60
Setting detail: THERAPIES SERIES
Discharge: HOME | End: 2020-09-28

## 2020-09-28 VITALS
SYSTOLIC BLOOD PRESSURE: 135 MMHG | BODY MASS INDEX: 30.57 KG/M2 | HEIGHT: 68 IN | DIASTOLIC BLOOD PRESSURE: 63 MMHG | HEART RATE: 86 BPM | WEIGHT: 201.7 LBS

## 2020-09-28 DIAGNOSIS — Z98.61 POST PTCA: Primary | ICD-10-CM

## 2020-09-28 PROCEDURE — 93798 PHYS/QHP OP CAR RHAB W/ECG: CPT

## 2020-09-30 ENCOUNTER — APPOINTMENT (OUTPATIENT)
Dept: CARDIAC REHAB | Facility: HOSPITAL | Age: 60
End: 2020-09-30

## 2020-09-30 ENCOUNTER — HOSPITAL ENCOUNTER (OUTPATIENT)
Dept: CARDIAC REHAB | Facility: HOSPITAL | Age: 60
Setting detail: THERAPIES SERIES
Discharge: HOME | End: 2020-09-30

## 2020-09-30 VITALS — DIASTOLIC BLOOD PRESSURE: 68 MMHG | SYSTOLIC BLOOD PRESSURE: 149 MMHG | HEART RATE: 79 BPM

## 2020-09-30 DIAGNOSIS — Z98.61 POST PTCA: Primary | ICD-10-CM

## 2020-09-30 PROCEDURE — 93798 PHYS/QHP OP CAR RHAB W/ECG: CPT

## 2020-10-02 ENCOUNTER — HOSPITAL ENCOUNTER (OUTPATIENT)
Dept: CARDIAC REHAB | Facility: HOSPITAL | Age: 60
Setting detail: THERAPIES SERIES
Discharge: HOME | End: 2020-10-02

## 2020-10-02 ENCOUNTER — APPOINTMENT (OUTPATIENT)
Dept: CARDIAC REHAB | Facility: HOSPITAL | Age: 60
End: 2020-10-02

## 2020-10-02 VITALS — SYSTOLIC BLOOD PRESSURE: 134 MMHG | DIASTOLIC BLOOD PRESSURE: 66 MMHG | HEART RATE: 76 BPM

## 2020-10-02 DIAGNOSIS — Z98.61 POST PTCA: Primary | ICD-10-CM

## 2020-10-02 PROCEDURE — 93798 PHYS/QHP OP CAR RHAB W/ECG: CPT

## 2020-10-05 ENCOUNTER — APPOINTMENT (OUTPATIENT)
Dept: CARDIAC REHAB | Facility: HOSPITAL | Age: 60
End: 2020-10-05

## 2020-10-05 ENCOUNTER — OFFICE VISIT (OUTPATIENT)
Dept: CARDIOLOGY | Facility: CLINIC | Age: 60
End: 2020-10-05

## 2020-10-05 ENCOUNTER — HOSPITAL ENCOUNTER (OUTPATIENT)
Dept: CARDIAC REHAB | Facility: HOSPITAL | Age: 60
Setting detail: THERAPIES SERIES
Discharge: HOME | End: 2020-10-05

## 2020-10-05 VITALS
WEIGHT: 207 LBS | SYSTOLIC BLOOD PRESSURE: 125 MMHG | DIASTOLIC BLOOD PRESSURE: 60 MMHG | HEIGHT: 68 IN | BODY MASS INDEX: 31.37 KG/M2 | HEART RATE: 72 BPM | OXYGEN SATURATION: 98 %

## 2020-10-05 VITALS
SYSTOLIC BLOOD PRESSURE: 135 MMHG | WEIGHT: 201.7 LBS | HEIGHT: 68 IN | DIASTOLIC BLOOD PRESSURE: 59 MMHG | BODY MASS INDEX: 30.57 KG/M2 | HEART RATE: 78 BPM

## 2020-10-05 DIAGNOSIS — I24.9 ACUTE CORONARY SYNDROME (HCC): Primary | ICD-10-CM

## 2020-10-05 DIAGNOSIS — Z98.61 POST PTCA: Primary | ICD-10-CM

## 2020-10-05 PROCEDURE — 93798 PHYS/QHP OP CAR RHAB W/ECG: CPT

## 2020-10-05 PROCEDURE — 99214 OFFICE O/P EST MOD 30 MIN: CPT | Performed by: INTERNAL MEDICINE

## 2020-10-05 RX ORDER — ROSUVASTATIN CALCIUM 40 MG/1
40 TABLET, COATED ORAL DAILY
Qty: 90 TABLET | Refills: 11 | Status: SHIPPED | OUTPATIENT
Start: 2020-10-05 | End: 2021-12-29

## 2020-10-05 RX ORDER — NITROGLYCERIN 0.4 MG/1
1 TABLET SUBLINGUAL
COMMUNITY
Start: 2020-08-18

## 2020-10-05 NOTE — PROGRESS NOTES
[facility]  OFFICE NOTE    Cardiovascular Medicine  Vanessa Crespo M.D., RPVI         No referring provider defined for this encounter.    Thank you for asking me to see Wood Colbert Jr. for CAD.    History of Present Illness  This is a 60 y.o. male with:    1.  Coronary artery disease status post PCI to mid LAD with 2.5 x 33 mm Xience stent in August 2020  2.  Congestive heart failure with reduced ejection fraction  3.  Hyperlipidemia  4.  Diabetes    Wood Colbert Jr. is a 60 y.o. male who presents for consultation today.  Patient was admitted to the hospital back in August with intermittent chest pain.  Patient had nuclear stress surgery significant ischemia anterior wall.  Subsequent cardiac cath showed obstructive disease in his mid LAD.  Patient with successful PCI without any complications.    Has recovered well.  Reports significant improvement in his functional capacity without any further episodes of chest pain.      Review of Systems - ROS  Constitution: Negative for weakness, weight gain and weight loss.   HENT: Negative for congestion.    Eyes: Negative for blurred vision.   Cardiovascular: As mentioned above  Respiratory: Negative for cough and hemoptysis.    Endocrine: Negative for polydipsia and polyuria.   Hematologic/Lymphatic: Negative for bleeding problem. Does not bruise/bleed easily.   Skin: Negative for flushing.   Musculoskeletal: Negative for neck pain and stiffness.   Gastrointestinal: Negative for abdominal pain, diarrhea, jaundice, melena, nausea and vomiting.   Genitourinary: Negative for dysuria and hematuria.   Neurological: Negative for dizziness, focal weakness and numbness.   Psychiatric/Behavioral: Negative for altered mental status and depression.          All other systems were reviewed and were negative.    family history includes COPD in his father; Cancer in his brother, brother, daughter, mother, and sister; Diabetes in his father and mother; Heart  disease in his father and mother; Other in his daughter.     reports that he has quit smoking. He has never used smokeless tobacco. He reports current alcohol use of about 2.0 standard drinks of alcohol per week. He reports current drug use. Drug: Marijuana.    Allergies   Allergen Reactions   • Lipitor [Atorvastatin] Other (See Comments)     Chest Pains  Chest pain, SOB   • Latex Rash         Current Outpatient Medications:   •  albuterol (PROVENTIL HFA;VENTOLIN HFA) 108 (90 BASE) MCG/ACT inhaler, Inhale 2 puffs every 6 (six) hours as needed for wheezing. Unconfirmed, Disp: , Rfl:   •  aspirin 81 MG chewable tablet, Chew 1 tablet Daily., Disp: 30 tablet, Rfl: 1  •  busPIRone (BUSPAR) 5 MG tablet, Take 5 mg by mouth 2 (Two) Times a Day., Disp: , Rfl: 11  •  cetirizine (ZyrTEC) 10 MG tablet, Take 10 mg by mouth daily. 1 tablet Unconfirmed, Disp: , Rfl:   •  diazePAM (VALIUM) 5 MG tablet, Take 5 mg by mouth 2 (Two) Times a Day As Needed for Anxiety., Disp: , Rfl:   •  dutasteride (AVODART) 0.5 MG capsule, 0.5 mg Daily., Disp: , Rfl:   •  Empagliflozin-Metformin HCl 5-500 MG tablet, Take  by mouth 2 (Two) Times a Day., Disp: , Rfl:   •  EPINEPHrine (EPIPEN) 0.3 MG/0.3ML solution auto-injector injection, Injection and upper thigh for severe allergic reaction, Disp: 1 each, Rfl: 1  •  fluticasone (FLONASE) 50 MCG/ACT nasal spray, 2 sprays into the nostril(s) as directed by provider Daily., Disp: 1 bottle, Rfl: 0  •  HYDROcodone-acetaminophen (VICODIN) 7.5-500 MG per tablet, Take  by mouth. Unconfirmed, Disp: , Rfl:   •  lisinopril (PRINIVIL,ZESTRIL) 5 MG tablet, Take 1 tablet by mouth Daily., Disp: 30 tablet, Rfl: 1  •  melatonin 5 MG tablet tablet, Take 1 tablet by mouth At Night As Needed for Sleep., Disp: 30 each, Rfl: 1  •  MELOXICAM PO, Take 15 mg by mouth. Unconfirmed , Disp: , Rfl:   •  metoprolol succinate XL (TOPROL-XL) 25 MG 24 hr tablet, Take 1 tablet by mouth Daily., Disp: 30 tablet, Rfl: 1  •  nitroglycerin  "(NITROSTAT) 0.4 MG SL tablet, Place 1 tablet under the tongue Every 5 (Five) Minutes. Up to 3 times per day, Disp: , Rfl:   •  ONE TOUCH ULTRA TEST test strip, , Disp: , Rfl: 12  •  prasugrel (EFFIENT) 10 MG tablet, Take 1 tablet by mouth Daily., Disp: 30 tablet, Rfl: 1  •  rosuvastatin (Crestor) 20 MG tablet, Take 1 tablet by mouth Daily., Disp: 30 tablet, Rfl: 3  •  sildenafil (VIAGRA) 100 MG tablet, Take 50 mg by mouth Daily As Needed for Erectile Dysfunction., Disp: , Rfl:   •  traMADol (ULTRAM) 50 MG tablet, Take 50 mg by mouth Every 6 (Six) Hours As Needed for Moderate Pain ., Disp: , Rfl:   •  TRULICITY 0.75 MG/0.5ML solution pen-injector, 1 (One) Time Per Week., Disp: , Rfl: 11    Physical Exam:  Vitals:    10/05/20 0853   BP: 125/60   BP Location: Right arm   Patient Position: Sitting   Cuff Size: Adult   Pulse: 72   SpO2: 98%   Weight: 93.9 kg (207 lb)   Height: 172.7 cm (68\")     Current Pain Level: none  Pulse Ox: Normal  on room air  General: alert, appears stated age and cooperative     Body Habitus: well-nourished    HEENT: Head: Normocephalic, no lesions, without obvious abnormality. No arcus senilis, xanthelasma or xanthomas.    Neuro: alert, oriented x3  Pulses: 2+ and symmetric  JVP: Volume/Pulsation: Normal.  Normal waveforms.   Appropriate inspiratory decrease.  No Kussmaul's. No Radha's.   Carotid Exam: no bruit normal pulsation bilaterally   Carotid Volume: normal.     Respirations: no increased work of breathing   Chest:  Normal    Pulmonary:Normal   Precordium: Normal impulses. P2 is not palpable.  RV Heave: absent  LV Heave: absent  [place]:  normal size and placement  Palpable S4: absent.  Heart rate: normal    Heart Rhythm: regular     Heart Sounds: S1: normal  S2: normal  S3: absent   S4: absent  Opening Snap: absent    Pericardial Rub:  Absent: .    Abdomen:   Appearance: normal .  Palpation: Soft, non-tender to palpation, bowel sounds positive in all four quadrants; no guarding or " rebound tenderness  Extremity: no edema.   LE Skin: no rashes  LE Hair:  normal  LE Pulses: well perfused with normal pulses in the distal extremities  Pallor on elevation: Absent. Rubor on dependency: None      DATA REVIEWED:       ECG/EMG Results (all)     None        ---------------------------------------------------  TTE/DUDLEY:  Results for orders placed during the hospital encounter of 08/08/20   Adult Transthoracic Echo Complete W/ Cont if Necessary Per Protocol    Narrative · Estimated EF appears to be in the range of 36 - 40%.  · Left ventricular systolic function is moderately decreased.  · The following left ventricular wall segments are hypokinetic: apical   anterior, apical inferior, apical septal, apex hypokinetic and mid   anteroseptal.  · Left ventricular diastolic dysfunction (grade I a) consistent with   impaired relaxation.  · There is mild calcification of the aortic valve.              --------------------------------------------------------------------------------------------------  LABS:     The CVD Risk score (Lauren et al., 2008) failed to calculate for the following reasons:    The patient has a prior MI, stroke, CHF, or peripheral vascular disease diagnosis         Lab Results   Component Value Date    GLUCOSE 149 (H) 08/12/2020    BUN 11 08/12/2020    CREATININE 0.81 08/12/2020    EGFRIFNONA 97 08/12/2020    BCR 13.6 08/12/2020    K 4.0 08/12/2020    CO2 25.0 08/12/2020    CALCIUM 9.0 08/12/2020    ALBUMIN 4.50 08/08/2020    AST 14 08/08/2020    ALT 16 08/08/2020     Lab Results   Component Value Date    WBC 8.52 08/12/2020    HGB 15.5 08/12/2020    HCT 45.5 08/12/2020    MCV 94.2 08/12/2020     08/12/2020     Lab Results   Component Value Date    CHOL 143 07/31/2020    TRIG 312 (H) 07/31/2020    HDL 17 (L) 07/31/2020    LDL 94 07/31/2020     Lab Results   Component Value Date    TSH 1.96 07/31/2019     Lab Results   Component Value Date    TROPONINT 0.017 08/11/2020     Lab  Results   Component Value Date    HGBA1C 7.7 (H) 07/31/2020     No results found for: DDIMER  Lab Results   Component Value Date    ALT 16 08/08/2020     Lab Results   Component Value Date    HGBA1C 7.7 (H) 07/31/2020    HGBA1C 7.5 (H) 07/31/2019    HGBA1C 7.1 (H) 06/25/2018     Lab Results   Component Value Date    CREATININE 0.81 08/12/2020     No results found for: IRON, TIBC, FERRITIN  No results found for: INR, PROTIME    Assessment/Plan     1.  Coronary artery disease:  Patient presented with unstable angina and subsequent abnormal stress test.  Cardiac cath showed obstructive disease in his mid LAD for which he underwent successful PCI guided by intravascular ultrasound without any complications 08/2020.  He did have obstructive disease in the diagonal and a small OM which was treated conservatively.  We will continue aspirin and Effient for minimum of 12 months.   Cardiac rehab  Echocardiogram showed mildly reduced EF with hypokinesis in the LAD territory.     2.  Congestive heart failure with reduced ejection fraction:  EF of 36 to 40% with hypokinesis in the LAD territory.  Appears euvolemic.  Continue metoprolol XL.  Continue lisinopril 5 mg.  Appears euvolemic and filling pressures are normal no need for diuresis at this point.  Uptitrate heart failure therapy as outpatient.  Not much room in uptitration for heart failure therapy  We will repeat echocardiogram in 4 months.     3.  Hyperlipidemia:  Uptitrate Crestor to 40 mg.  Target LDL less than 70.  Triglyceride is elevated as well will consider Vascepa as outpatient     4.  Diabetes:  Being managed with primary care        Patient's Body mass index is 31.04 kg/m². BMI is above normal parameters. Recommendations include: exercise counseling and nutrition counseling.        Wood Colbert Jr.  reports that he has quit smoking. He does not have any smokeless tobacco history on file..        AAA Screening:             This document has been electronically  signed by Vanessa Crespo MD on October 5, 2020 09:04 CDT

## 2020-10-07 ENCOUNTER — APPOINTMENT (OUTPATIENT)
Dept: CARDIAC REHAB | Facility: HOSPITAL | Age: 60
End: 2020-10-07

## 2020-10-07 ENCOUNTER — HOSPITAL ENCOUNTER (OUTPATIENT)
Dept: CARDIAC REHAB | Facility: HOSPITAL | Age: 60
Setting detail: THERAPIES SERIES
Discharge: HOME | End: 2020-10-07

## 2020-10-07 VITALS — DIASTOLIC BLOOD PRESSURE: 60 MMHG | HEART RATE: 88 BPM | SYSTOLIC BLOOD PRESSURE: 133 MMHG

## 2020-10-07 DIAGNOSIS — Z98.61 POST PTCA: Primary | ICD-10-CM

## 2020-10-07 PROCEDURE — 93798 PHYS/QHP OP CAR RHAB W/ECG: CPT

## 2020-10-09 ENCOUNTER — APPOINTMENT (OUTPATIENT)
Dept: CARDIAC REHAB | Facility: HOSPITAL | Age: 60
End: 2020-10-09

## 2020-10-09 ENCOUNTER — HOSPITAL ENCOUNTER (OUTPATIENT)
Dept: CARDIAC REHAB | Facility: HOSPITAL | Age: 60
Setting detail: THERAPIES SERIES
Discharge: HOME | End: 2020-10-09

## 2020-10-09 VITALS — DIASTOLIC BLOOD PRESSURE: 58 MMHG | HEART RATE: 100 BPM | SYSTOLIC BLOOD PRESSURE: 127 MMHG

## 2020-10-09 DIAGNOSIS — Z98.61 POST PTCA: Primary | ICD-10-CM

## 2020-10-09 PROCEDURE — 93798 PHYS/QHP OP CAR RHAB W/ECG: CPT

## 2020-10-12 ENCOUNTER — HOSPITAL ENCOUNTER (OUTPATIENT)
Dept: CARDIAC REHAB | Facility: HOSPITAL | Age: 60
Setting detail: THERAPIES SERIES
Discharge: HOME | End: 2020-10-12

## 2020-10-12 VITALS
HEART RATE: 89 BPM | HEIGHT: 68 IN | DIASTOLIC BLOOD PRESSURE: 60 MMHG | BODY MASS INDEX: 30.43 KG/M2 | WEIGHT: 200.8 LBS | SYSTOLIC BLOOD PRESSURE: 137 MMHG

## 2020-10-12 DIAGNOSIS — Z98.61 POST PTCA: Primary | ICD-10-CM

## 2020-10-12 PROCEDURE — 93798 PHYS/QHP OP CAR RHAB W/ECG: CPT

## 2020-10-14 ENCOUNTER — HOSPITAL ENCOUNTER (OUTPATIENT)
Dept: CARDIAC REHAB | Facility: HOSPITAL | Age: 60
Setting detail: THERAPIES SERIES
Discharge: HOME | End: 2020-10-14

## 2020-10-14 VITALS — SYSTOLIC BLOOD PRESSURE: 138 MMHG | HEART RATE: 86 BPM | DIASTOLIC BLOOD PRESSURE: 63 MMHG

## 2020-10-14 DIAGNOSIS — Z98.61 POST PTCA: Primary | ICD-10-CM

## 2020-10-14 PROCEDURE — 93798 PHYS/QHP OP CAR RHAB W/ECG: CPT

## 2020-10-16 ENCOUNTER — HOSPITAL ENCOUNTER (OUTPATIENT)
Dept: CARDIAC REHAB | Facility: HOSPITAL | Age: 60
Setting detail: THERAPIES SERIES
Discharge: HOME | End: 2020-10-16

## 2020-10-16 VITALS — DIASTOLIC BLOOD PRESSURE: 67 MMHG | SYSTOLIC BLOOD PRESSURE: 135 MMHG | HEART RATE: 70 BPM

## 2020-10-16 DIAGNOSIS — Z98.61 POST PTCA: Primary | ICD-10-CM

## 2020-10-16 PROCEDURE — 93798 PHYS/QHP OP CAR RHAB W/ECG: CPT

## 2020-10-19 ENCOUNTER — HOSPITAL ENCOUNTER (OUTPATIENT)
Dept: CARDIAC REHAB | Facility: HOSPITAL | Age: 60
Setting detail: THERAPIES SERIES
Discharge: HOME | End: 2020-10-19

## 2020-10-19 VITALS — HEART RATE: 78 BPM | SYSTOLIC BLOOD PRESSURE: 132 MMHG | DIASTOLIC BLOOD PRESSURE: 59 MMHG

## 2020-10-19 DIAGNOSIS — Z98.61 POST PTCA: Primary | ICD-10-CM

## 2020-10-19 PROCEDURE — 93798 PHYS/QHP OP CAR RHAB W/ECG: CPT

## 2020-10-21 ENCOUNTER — HOSPITAL ENCOUNTER (OUTPATIENT)
Dept: CARDIAC REHAB | Facility: HOSPITAL | Age: 60
Setting detail: THERAPIES SERIES
Discharge: HOME | End: 2020-10-21

## 2020-10-21 VITALS — SYSTOLIC BLOOD PRESSURE: 111 MMHG | DIASTOLIC BLOOD PRESSURE: 61 MMHG | HEART RATE: 94 BPM

## 2020-10-21 DIAGNOSIS — Z98.61 POST PTCA: Primary | ICD-10-CM

## 2020-10-21 PROCEDURE — 93798 PHYS/QHP OP CAR RHAB W/ECG: CPT

## 2020-10-23 ENCOUNTER — APPOINTMENT (OUTPATIENT)
Dept: CARDIAC REHAB | Facility: HOSPITAL | Age: 60
End: 2020-10-23

## 2020-10-26 ENCOUNTER — HOSPITAL ENCOUNTER (OUTPATIENT)
Dept: CARDIAC REHAB | Facility: HOSPITAL | Age: 60
Setting detail: THERAPIES SERIES
Discharge: HOME | End: 2020-10-26

## 2020-10-26 VITALS
DIASTOLIC BLOOD PRESSURE: 71 MMHG | HEART RATE: 94 BPM | WEIGHT: 206 LBS | SYSTOLIC BLOOD PRESSURE: 137 MMHG | BODY MASS INDEX: 31.32 KG/M2

## 2020-10-26 DIAGNOSIS — Z98.61 POST PTCA: Primary | ICD-10-CM

## 2020-10-26 PROCEDURE — 93798 PHYS/QHP OP CAR RHAB W/ECG: CPT

## 2020-10-28 ENCOUNTER — HOSPITAL ENCOUNTER (OUTPATIENT)
Dept: CARDIAC REHAB | Facility: HOSPITAL | Age: 60
Setting detail: THERAPIES SERIES
Discharge: HOME | End: 2020-10-28

## 2020-10-28 VITALS — DIASTOLIC BLOOD PRESSURE: 63 MMHG | HEART RATE: 86 BPM | SYSTOLIC BLOOD PRESSURE: 166 MMHG

## 2020-10-28 DIAGNOSIS — Z98.61 POST PTCA: Primary | ICD-10-CM

## 2020-10-28 PROCEDURE — 93798 PHYS/QHP OP CAR RHAB W/ECG: CPT

## 2020-11-02 ENCOUNTER — HOSPITAL ENCOUNTER (OUTPATIENT)
Dept: CARDIAC REHAB | Facility: HOSPITAL | Age: 60
Setting detail: THERAPIES SERIES
Discharge: HOME | End: 2020-11-02

## 2020-11-02 VITALS
HEART RATE: 70 BPM | WEIGHT: 200 LBS | DIASTOLIC BLOOD PRESSURE: 61 MMHG | BODY MASS INDEX: 30.31 KG/M2 | HEIGHT: 68 IN | SYSTOLIC BLOOD PRESSURE: 134 MMHG

## 2020-11-02 DIAGNOSIS — Z98.61 POST PTCA: Primary | ICD-10-CM

## 2020-11-02 PROCEDURE — 93798 PHYS/QHP OP CAR RHAB W/ECG: CPT

## 2020-11-04 ENCOUNTER — HOSPITAL ENCOUNTER (OUTPATIENT)
Dept: CARDIAC REHAB | Facility: HOSPITAL | Age: 60
Setting detail: THERAPIES SERIES
Discharge: HOME | End: 2020-11-04

## 2020-11-04 VITALS — DIASTOLIC BLOOD PRESSURE: 63 MMHG | HEART RATE: 87 BPM | SYSTOLIC BLOOD PRESSURE: 129 MMHG

## 2020-11-04 DIAGNOSIS — Z98.61 POST PTCA: Primary | ICD-10-CM

## 2020-11-04 PROCEDURE — 93798 PHYS/QHP OP CAR RHAB W/ECG: CPT

## 2020-11-06 ENCOUNTER — APPOINTMENT (OUTPATIENT)
Dept: CARDIAC REHAB | Facility: HOSPITAL | Age: 60
End: 2020-11-06

## 2020-11-11 ENCOUNTER — HOSPITAL ENCOUNTER (OUTPATIENT)
Dept: CARDIAC REHAB | Facility: HOSPITAL | Age: 60
Setting detail: THERAPIES SERIES
Discharge: HOME | End: 2020-11-11

## 2020-11-11 VITALS
HEART RATE: 72 BPM | WEIGHT: 198 LBS | DIASTOLIC BLOOD PRESSURE: 61 MMHG | BODY MASS INDEX: 30.01 KG/M2 | HEIGHT: 68 IN | SYSTOLIC BLOOD PRESSURE: 136 MMHG

## 2020-11-11 DIAGNOSIS — Z98.61 POST PTCA: Primary | ICD-10-CM

## 2020-11-11 PROCEDURE — 93798 PHYS/QHP OP CAR RHAB W/ECG: CPT

## 2020-11-13 ENCOUNTER — APPOINTMENT (OUTPATIENT)
Dept: CARDIAC REHAB | Facility: HOSPITAL | Age: 60
End: 2020-11-13

## 2020-11-16 ENCOUNTER — HOSPITAL ENCOUNTER (OUTPATIENT)
Dept: CARDIAC REHAB | Facility: HOSPITAL | Age: 60
Setting detail: THERAPIES SERIES
Discharge: HOME | End: 2020-11-16

## 2020-11-16 VITALS
HEIGHT: 68 IN | DIASTOLIC BLOOD PRESSURE: 62 MMHG | BODY MASS INDEX: 30.01 KG/M2 | SYSTOLIC BLOOD PRESSURE: 146 MMHG | WEIGHT: 198 LBS | HEART RATE: 71 BPM

## 2020-11-16 DIAGNOSIS — Z98.61 POST PTCA: Primary | ICD-10-CM

## 2020-11-16 PROCEDURE — 93798 PHYS/QHP OP CAR RHAB W/ECG: CPT

## 2020-11-18 ENCOUNTER — HOSPITAL ENCOUNTER (OUTPATIENT)
Dept: CARDIAC REHAB | Facility: HOSPITAL | Age: 60
Setting detail: THERAPIES SERIES
Discharge: HOME | End: 2020-11-18

## 2020-11-18 VITALS — DIASTOLIC BLOOD PRESSURE: 64 MMHG | HEART RATE: 88 BPM | SYSTOLIC BLOOD PRESSURE: 134 MMHG

## 2020-11-18 DIAGNOSIS — Z98.61 POST PTCA: Primary | ICD-10-CM

## 2020-11-18 PROCEDURE — 93798 PHYS/QHP OP CAR RHAB W/ECG: CPT

## 2020-11-20 ENCOUNTER — HOSPITAL ENCOUNTER (OUTPATIENT)
Dept: CARDIAC REHAB | Facility: HOSPITAL | Age: 60
Setting detail: THERAPIES SERIES
Discharge: HOME | End: 2020-11-20

## 2020-11-20 VITALS — SYSTOLIC BLOOD PRESSURE: 134 MMHG | DIASTOLIC BLOOD PRESSURE: 64 MMHG | HEART RATE: 88 BPM

## 2020-11-20 DIAGNOSIS — Z98.61 POST PTCA: Primary | ICD-10-CM

## 2020-11-20 PROCEDURE — 93798 PHYS/QHP OP CAR RHAB W/ECG: CPT

## 2020-11-23 ENCOUNTER — HOSPITAL ENCOUNTER (OUTPATIENT)
Dept: CARDIAC REHAB | Facility: HOSPITAL | Age: 60
Setting detail: THERAPIES SERIES
Discharge: HOME | End: 2020-11-23

## 2020-11-23 VITALS
DIASTOLIC BLOOD PRESSURE: 65 MMHG | HEART RATE: 76 BPM | BODY MASS INDEX: 30.26 KG/M2 | WEIGHT: 199 LBS | SYSTOLIC BLOOD PRESSURE: 148 MMHG

## 2020-11-23 DIAGNOSIS — Z98.61 POST PTCA: Primary | ICD-10-CM

## 2020-11-23 PROCEDURE — 93798 PHYS/QHP OP CAR RHAB W/ECG: CPT

## 2020-11-25 ENCOUNTER — HOSPITAL ENCOUNTER (OUTPATIENT)
Dept: CARDIAC REHAB | Facility: HOSPITAL | Age: 60
Setting detail: THERAPIES SERIES
Discharge: HOME | End: 2020-11-25

## 2020-11-25 VITALS — HEART RATE: 81 BPM | SYSTOLIC BLOOD PRESSURE: 132 MMHG | DIASTOLIC BLOOD PRESSURE: 61 MMHG

## 2020-11-25 DIAGNOSIS — Z98.61 POST PTCA: Primary | ICD-10-CM

## 2020-11-25 PROCEDURE — 93798 PHYS/QHP OP CAR RHAB W/ECG: CPT

## 2020-11-30 ENCOUNTER — HOSPITAL ENCOUNTER (OUTPATIENT)
Dept: CARDIAC REHAB | Facility: HOSPITAL | Age: 60
Setting detail: THERAPIES SERIES
Discharge: HOME | End: 2020-11-30

## 2020-11-30 VITALS
DIASTOLIC BLOOD PRESSURE: 61 MMHG | WEIGHT: 198 LBS | BODY MASS INDEX: 30.01 KG/M2 | HEIGHT: 68 IN | HEART RATE: 84 BPM | SYSTOLIC BLOOD PRESSURE: 138 MMHG

## 2020-11-30 DIAGNOSIS — Z98.61 POST PTCA: Primary | ICD-10-CM

## 2020-11-30 PROCEDURE — 93798 PHYS/QHP OP CAR RHAB W/ECG: CPT

## 2020-12-02 ENCOUNTER — HOSPITAL ENCOUNTER (OUTPATIENT)
Dept: CARDIAC REHAB | Facility: HOSPITAL | Age: 60
Setting detail: THERAPIES SERIES
Discharge: HOME | End: 2020-12-02

## 2020-12-02 VITALS — SYSTOLIC BLOOD PRESSURE: 126 MMHG | DIASTOLIC BLOOD PRESSURE: 59 MMHG | HEART RATE: 73 BPM

## 2020-12-02 DIAGNOSIS — Z98.61 POST PTCA: Primary | ICD-10-CM

## 2020-12-02 PROCEDURE — 93798 PHYS/QHP OP CAR RHAB W/ECG: CPT

## 2020-12-04 ENCOUNTER — APPOINTMENT (OUTPATIENT)
Dept: CARDIAC REHAB | Facility: HOSPITAL | Age: 60
End: 2020-12-04

## 2020-12-07 ENCOUNTER — APPOINTMENT (OUTPATIENT)
Dept: CARDIAC REHAB | Facility: HOSPITAL | Age: 60
End: 2020-12-07

## 2021-03-08 ENCOUNTER — OFFICE VISIT (OUTPATIENT)
Dept: CARDIOLOGY | Facility: CLINIC | Age: 61
End: 2021-03-08

## 2021-03-08 VITALS
HEIGHT: 68 IN | OXYGEN SATURATION: 98 % | WEIGHT: 205 LBS | BODY MASS INDEX: 31.07 KG/M2 | HEART RATE: 84 BPM | SYSTOLIC BLOOD PRESSURE: 120 MMHG | DIASTOLIC BLOOD PRESSURE: 78 MMHG

## 2021-03-08 DIAGNOSIS — I25.10 CORONARY ARTERY DISEASE DUE TO LIPID RICH PLAQUE: Primary | ICD-10-CM

## 2021-03-08 DIAGNOSIS — I25.83 CORONARY ARTERY DISEASE DUE TO LIPID RICH PLAQUE: Primary | ICD-10-CM

## 2021-03-08 DIAGNOSIS — I10 HYPERTENSION, ESSENTIAL: ICD-10-CM

## 2021-03-08 DIAGNOSIS — E78.2 HYPERLIPEMIA, MIXED: ICD-10-CM

## 2021-03-08 PROCEDURE — 99214 OFFICE O/P EST MOD 30 MIN: CPT | Performed by: INTERNAL MEDICINE

## 2021-03-08 NOTE — PROGRESS NOTES
[facility] Cardiology  OFFICE NOTE    Cardiovascular Medicine  Vanessa Crespo M.D., RPVI         No referring provider defined for this encounter.    Thank you for asking me to see Wood Colbert Jr. for CAD.    History of Present Illness  This is a 61 y.o. male with:    1.  Coronary artery disease status post PCI to mid LAD with 2.5 x 33 mm Xience stent in August 2020  2.  Congestive heart failure with reduced ejection fraction  3.  Hyperlipidemia  4.  Diabetes    Wood Colbert Jr. is a 61 y.o. male who presents for consultation today.  Patient was admitted to the hospital back in August with intermittent chest pain.  Patient had nuclear stress surgery significant ischemia anterior wall.  Subsequent cardiac cath showed obstructive disease in his mid LAD.  Patient with successful PCI without any complications.    Has recovered well.  Reports significant improvement in his functional capacity without any further episodes of chest pain.    03/08/2021:  No acute issues since last visit.  Patient reported significant improvement in his quality of life.  Denying any chest pain or shortness of breath.  He does have chronic shoulder pain and takes the meloxicam for them, I did advise him to . His primary care physician for alternatives to avoid NSAIDs with.      Review of Systems - ROS  Constitution: Negative for weakness, weight gain and weight loss.   HENT: Negative for congestion.    Eyes: Negative for blurred vision.   Cardiovascular: As mentioned above  Respiratory: Negative for cough and hemoptysis.    Endocrine: Negative for polydipsia and polyuria.   Hematologic/Lymphatic: Negative for bleeding problem. Does not bruise/bleed easily.   Skin: Negative for flushing.   Musculoskeletal: Negative for neck pain and stiffness.   Gastrointestinal: Negative for abdominal pain, diarrhea, jaundice, melena, nausea and vomiting.   Genitourinary: Negative for dysuria and hematuria.   Neurological: Negative for  dizziness, focal weakness and numbness.   Psychiatric/Behavioral: Negative for altered mental status and depression.          All other systems were reviewed and were negative.    family history includes COPD in his father; Cancer in his brother, brother, daughter, mother, and sister; Diabetes in his father and mother; Heart disease in his father and mother; Other in his daughter.     reports that he has quit smoking. He has never used smokeless tobacco. He reports current alcohol use of about 2.0 standard drinks of alcohol per week. He reports current drug use. Drug: Marijuana.    Allergies   Allergen Reactions   • Lipitor [Atorvastatin] Other (See Comments)     Chest Pains  Chest pain, SOB   • Latex Rash         Current Outpatient Medications:   •  albuterol (PROVENTIL HFA;VENTOLIN HFA) 108 (90 BASE) MCG/ACT inhaler, Inhale 2 puffs every 6 (six) hours as needed for wheezing. Unconfirmed, Disp: , Rfl:   •  aspirin 81 MG chewable tablet, Chew 1 tablet Daily., Disp: 30 tablet, Rfl: 1  •  busPIRone (BUSPAR) 5 MG tablet, Take 5 mg by mouth 2 (Two) Times a Day., Disp: , Rfl: 11  •  cetirizine (ZyrTEC) 10 MG tablet, Take 10 mg by mouth daily. 1 tablet Unconfirmed, Disp: , Rfl:   •  diazePAM (VALIUM) 5 MG tablet, Take 5 mg by mouth 2 (Two) Times a Day As Needed for Anxiety., Disp: , Rfl:   •  dutasteride (AVODART) 0.5 MG capsule, 0.5 mg Daily., Disp: , Rfl:   •  Empagliflozin-Metformin HCl 5-500 MG tablet, Take  by mouth 2 (Two) Times a Day., Disp: , Rfl:   •  EPINEPHrine (EPIPEN) 0.3 MG/0.3ML solution auto-injector injection, Injection and upper thigh for severe allergic reaction, Disp: 1 each, Rfl: 1  •  fluticasone (FLONASE) 50 MCG/ACT nasal spray, 2 sprays into the nostril(s) as directed by provider Daily., Disp: 1 bottle, Rfl: 0  •  HYDROcodone-acetaminophen (VICODIN) 7.5-500 MG per tablet, Take  by mouth. Unconfirmed, Disp: , Rfl:   •  lisinopril (PRINIVIL,ZESTRIL) 5 MG tablet, Take 1 tablet by mouth Daily., Disp:  "30 tablet, Rfl: 1  •  melatonin 5 MG tablet tablet, Take 1 tablet by mouth At Night As Needed for Sleep., Disp: 30 each, Rfl: 1  •  MELOXICAM PO, Take 15 mg by mouth. Unconfirmed , Disp: , Rfl:   •  metoprolol succinate XL (TOPROL-XL) 25 MG 24 hr tablet, Take 1 tablet by mouth Daily., Disp: 30 tablet, Rfl: 1  •  nitroglycerin (NITROSTAT) 0.4 MG SL tablet, Place 1 tablet under the tongue Every 5 (Five) Minutes. Up to 3 times per day, Disp: , Rfl:   •  ONE TOUCH ULTRA TEST test strip, , Disp: , Rfl: 12  •  prasugrel (EFFIENT) 10 MG tablet, Take 1 tablet by mouth Daily., Disp: 30 tablet, Rfl: 1  •  rosuvastatin (CRESTOR) 40 MG tablet, Take 1 tablet by mouth Daily., Disp: 90 tablet, Rfl: 11  •  sildenafil (VIAGRA) 100 MG tablet, Take 50 mg by mouth Daily As Needed for Erectile Dysfunction., Disp: , Rfl:   •  traMADol (ULTRAM) 50 MG tablet, Take 50 mg by mouth Every 6 (Six) Hours As Needed for Moderate Pain ., Disp: , Rfl:   •  TRULICITY 0.75 MG/0.5ML solution pen-injector, 1 (One) Time Per Week., Disp: , Rfl: 11    Physical Exam:  Vitals:    03/08/21 0802   BP: 120/78   BP Location: Right arm   Patient Position: Sitting   Cuff Size: Adult   Pulse: 84   SpO2: 98%   Weight: 93 kg (205 lb)   Height: 172.7 cm (67.99\")   PainSc: 0-No pain     Current Pain Level: none  Pulse Ox: Normal  on room air  General: alert, appears stated age and cooperative     Body Habitus: well-nourished    HEENT: Head: Normocephalic, no lesions, without obvious abnormality. No arcus senilis, xanthelasma or xanthomas.    Neuro: alert, oriented x3  Pulses: 2+ and symmetric  JVP: Volume/Pulsation: Normal.  Normal waveforms.   Appropriate inspiratory decrease.  No Kussmaul's. No Radha's.   Carotid Exam: no bruit normal pulsation bilaterally   Carotid Volume: normal.     Respirations: no increased work of breathing   Chest:  Normal    Pulmonary:Normal   Precordium: Normal impulses. P2 is not palpable.  RV Heave: absent  LV Heave: absent  [place]:  " normal size and placement  Palpable S4: absent.  Heart rate: normal    Heart Rhythm: regular     Heart Sounds: S1: normal  S2: normal  S3: absent   S4: absent  Opening Snap: absent    Pericardial Rub:  Absent: .    Abdomen:   Appearance: normal .  Palpation: Soft, non-tender to palpation, bowel sounds positive in all four quadrants; no guarding or rebound tenderness  Extremity: no edema.   LE Skin: no rashes  LE Hair:  normal  LE Pulses: well perfused with normal pulses in the distal extremities  Pallor on elevation: Absent. Rubor on dependency: None      DATA REVIEWED:       ECG/EMG Results (all)     None        ---------------------------------------------------  TTE/DUDLEY:  Results for orders placed during the hospital encounter of 02/12/21    Adult Transthoracic Echo Complete W/ Cont if Necessary Per Protocol    Interpretation Summary  · Left ventricular wall thickness is consistent with mild concentric hypertrophy.  · Estimated left ventricular EF = 56% Left ventricular ejection fraction appears to be 56 - 60%. Left ventricular systolic function is normal.  · Left ventricular diastolic function is consistent with (grade I) impaired relaxation.  · The right ventricular cavity is borderline dilated.  · Estimated right ventricular systolic pressure from tricuspid regurgitation is normal (<35 mmHg).          --------------------------------------------------------------------------------------------------  LABS:     The CVD Risk score (Lauren, et al., 2008) failed to calculate for the following reasons:    The patient has a prior MI, stroke, CHF, or peripheral vascular disease diagnosis         Lab Results   Component Value Date    GLUCOSE 149 (H) 08/12/2020    BUN 11 08/12/2020    CREATININE 0.81 08/12/2020    EGFRIFNONA 97 08/12/2020    BCR 13.6 08/12/2020    K 4.0 08/12/2020    CO2 25.0 08/12/2020    CALCIUM 9.0 08/12/2020    ALBUMIN 4.50 08/08/2020    AST 14 08/08/2020    ALT 16 08/08/2020     Lab Results    Component Value Date    WBC 8.52 08/12/2020    HGB 15.5 08/12/2020    HCT 45.5 08/12/2020    MCV 94.2 08/12/2020     08/12/2020     Lab Results   Component Value Date    CHOL 81 11/05/2020    TRIG 167 (H) 11/05/2020    HDL 16 (L) 11/05/2020    LDL 40 11/05/2020     Lab Results   Component Value Date    TSH 1.96 07/31/2019     Lab Results   Component Value Date    TROPONINT 0.017 08/11/2020     Lab Results   Component Value Date    HGBA1C 7.5 (H) 11/05/2020     No results found for: DDIMER  Lab Results   Component Value Date    ALT 16 08/08/2020     Lab Results   Component Value Date    HGBA1C 7.5 (H) 11/05/2020    HGBA1C 7.7 (H) 07/31/2020    HGBA1C 7.5 (H) 07/31/2019     Lab Results   Component Value Date    CREATININE 0.81 08/12/2020     No results found for: IRON, TIBC, FERRITIN  No results found for: INR, PROTIME    Assessment/Plan     1.  Coronary artery disease:  Patient presented with unstable angina and subsequent abnormal stress test.  Cardiac cath showed obstructive disease in his mid LAD for which he underwent successful PCI guided by intravascular ultrasound without any complications 08/2020.  He did have obstructive disease in the diagonal and a small OM which was treated conservatively.  We will continue aspirin and Effient for minimum of 12 months.   He potentially has shoulder surgery coming up, advised him to hold off until August.  Cardiac rehab  Echocardiogram showed mildly reduced EF with hypokinesis in the LAD territory.     2.  Congestive heart failure with reduced ejection fraction:  EF of 36 to 40% with hypokinesis in the LAD territory.  repeat echo 02/2021 with normal LV systolic function.   Continue metoprolol XL.  Continue lisinopril 5 mg.  Appears euvolemic and filling pressures are normal no need for diuresis at this point.  Not much room in uptitration for heart failure therapy       3.  Hyperlipidemia:  Uptitrated Crestor to 40 mg.  LDL <70.  Triglyceride is elevated as well  will consider Vascepa as outpatient     4.  Diabetes:  Being managed with primary care        Patient's Body mass index is 31.04 kg/m². BMI is above normal parameters. Recommendations include: exercise counseling and nutrition counseling.        Wood Colbert Jr.  reports that he has quit smoking. He does not have any smokeless tobacco history on file..        AAA Screening:             This document has been electronically signed by Vanessa Crespo MD on March 8, 2021 08:16 CST

## 2021-09-24 ENCOUNTER — OFFICE VISIT (OUTPATIENT)
Dept: CARDIOLOGY | Facility: CLINIC | Age: 61
End: 2021-09-24

## 2021-09-24 VITALS
DIASTOLIC BLOOD PRESSURE: 78 MMHG | SYSTOLIC BLOOD PRESSURE: 130 MMHG | WEIGHT: 196 LBS | HEART RATE: 87 BPM | OXYGEN SATURATION: 98 % | HEIGHT: 67 IN | BODY MASS INDEX: 30.76 KG/M2

## 2021-09-24 DIAGNOSIS — I25.10 CORONARY ARTERY DISEASE INVOLVING NATIVE CORONARY ARTERY OF NATIVE HEART WITHOUT ANGINA PECTORIS: Primary | ICD-10-CM

## 2021-09-24 PROCEDURE — 99214 OFFICE O/P EST MOD 30 MIN: CPT | Performed by: INTERNAL MEDICINE

## 2021-09-24 PROCEDURE — 93000 ELECTROCARDIOGRAM COMPLETE: CPT | Performed by: INTERNAL MEDICINE

## 2021-09-24 RX ORDER — DULAGLUTIDE 3 MG/.5ML
INJECTION, SOLUTION SUBCUTANEOUS
COMMUNITY
Start: 2021-09-03

## 2021-09-24 RX ORDER — PANTOPRAZOLE SODIUM 40 MG/1
40 TABLET, DELAYED RELEASE ORAL DAILY
Qty: 30 TABLET | Refills: 3 | Status: SHIPPED | OUTPATIENT
Start: 2021-09-24 | End: 2022-06-01

## 2021-10-04 LAB
QT INTERVAL: 350 MS
QTC INTERVAL: 425 MS

## 2021-12-29 RX ORDER — ROSUVASTATIN CALCIUM 40 MG/1
40 TABLET, COATED ORAL DAILY
Qty: 90 TABLET | Refills: 3 | Status: SHIPPED | OUTPATIENT
Start: 2021-12-29

## 2022-06-01 ENCOUNTER — OFFICE VISIT (OUTPATIENT)
Dept: CARDIOLOGY | Facility: CLINIC | Age: 62
End: 2022-06-01

## 2022-06-01 VITALS
HEART RATE: 88 BPM | SYSTOLIC BLOOD PRESSURE: 114 MMHG | WEIGHT: 200 LBS | BODY MASS INDEX: 31.39 KG/M2 | HEIGHT: 67 IN | DIASTOLIC BLOOD PRESSURE: 72 MMHG | OXYGEN SATURATION: 98 %

## 2022-06-01 DIAGNOSIS — I25.10 CORONARY ARTERY DISEASE INVOLVING NATIVE CORONARY ARTERY OF NATIVE HEART WITHOUT ANGINA PECTORIS: Primary | ICD-10-CM

## 2022-06-01 PROCEDURE — 99214 OFFICE O/P EST MOD 30 MIN: CPT | Performed by: INTERNAL MEDICINE

## 2022-06-01 PROCEDURE — 93000 ELECTROCARDIOGRAM COMPLETE: CPT | Performed by: INTERNAL MEDICINE

## 2022-06-01 RX ORDER — OMEPRAZOLE 20 MG/1
20 CAPSULE, DELAYED RELEASE ORAL AS NEEDED
COMMUNITY

## 2022-06-01 RX ORDER — RANOLAZINE 500 MG/1
500 TABLET, EXTENDED RELEASE ORAL 2 TIMES DAILY
Qty: 60 TABLET | Refills: 3 | Status: SHIPPED | OUTPATIENT
Start: 2022-06-01 | End: 2022-09-26

## 2022-06-01 RX ORDER — MULTIPLE VITAMINS W/ MINERALS TAB 9MG-400MCG
1 TAB ORAL DAILY
COMMUNITY

## 2022-06-01 NOTE — PROGRESS NOTES
[facility] Cardiology  OFFICE NOTE    Cardiovascular Medicine  Vanessa Crespo M.D., RPVI         No referring provider defined for this encounter.    Thank you for asking me to see Wood Colbert Jr. for CAD.    Coronary Artery Disease  Symptoms include chest pain.     This is a 62 y.o. male with:    1.  Coronary artery disease status post PCI to mid LAD with 2.5 x 33 mm Xience stent in August 2020  2.  Congestive heart failure with reduced ejection fraction  3.  Hyperlipidemia  4.  Diabetes    Wood Colbert Jr. is a 62 y.o. male who presents for consultation today.  Patient was admitted to the hospital back in August 2020 with intermittent chest pain.  Patient had nuclear stress surgery significant ischemia anterior wall.  Subsequent cardiac cath showed obstructive disease in his mid LAD.  Patient with successful PCI without any complications.    Has recovered well.  Reports significant improvement in his functional capacity without any further episodes of chest pain.    03/08/2021:  No acute issues since last visit.  Patient reported significant improvement in his quality of life.  Denying any chest pain or shortness of breath.  He does have chronic shoulder pain and takes the meloxicam for them, I did advise him to . His primary care physician for alternatives to avoid NSAIDs with.    09/24/2021:  Reported occasionally when he gets up in the morning he will have some heaviness which gets better when he gets up and walk around.  He is able to perform actively at his work without any limitations from chest pain or shortness of breath.  He has dental work and surgery coming up.    06/01/2022:  Has done well since last visit.  He continues to have subcostal pain when he gets up in the morning.  Denying any chest pain with exertion.  He had typical symptoms with his coronary artery disease previously.      Review of Systems - Constitution: Negative for weakness, weight gain and weight loss.   HENT:  Negative for congestion.    Eyes: Negative for blurred vision.   Cardiovascular: As mentioned above  Respiratory: Negative for cough and hemoptysis.    Endocrine: Negative for polydipsia and polyuria.   Hematologic/Lymphatic: Negative for bleeding problem. Does not bruise/bleed easily.   Skin: Negative for flushing.   Musculoskeletal: Negative for neck pain and stiffness.   Gastrointestinal: Negative for abdominal pain, diarrhea, jaundice, melena, nausea and vomiting.   Genitourinary: Negative for dysuria and hematuria.   Neurological: Negative for dizziness, focal weakness and numbness.   Psychiatric/Behavioral: Negative for altered mental status and depression.          All other systems were reviewed and were negative.    family history includes COPD in his father; Cancer in his brother, brother, daughter, mother, and sister; Diabetes in his father and mother; Heart disease in his father and mother; Other in his daughter.     reports that he has quit smoking. He has never used smokeless tobacco. He reports current alcohol use of about 2.0 standard drinks of alcohol per week. He reports current drug use. Drug: Marijuana.    Allergies   Allergen Reactions   • Lipitor [Atorvastatin] Other (See Comments)     Chest Pains  Chest pain, SOB   • Latex Rash         Current Outpatient Medications:   •  albuterol (PROVENTIL HFA;VENTOLIN HFA) 108 (90 BASE) MCG/ACT inhaler, Inhale 2 puffs Every 6 (Six) Hours As Needed for Wheezing. Unconfirmed, Disp: , Rfl:   •  busPIRone (BUSPAR) 5 MG tablet, Take 5 mg by mouth As Needed., Disp: , Rfl: 11  •  cetirizine (ZyrTEC) 10 MG tablet, Take 10 mg by mouth Daily. 1 tablet Unconfirmed, Disp: , Rfl:   •  diazePAM (VALIUM) 5 MG tablet, Take 5 mg by mouth 2 (Two) Times a Day As Needed for Anxiety., Disp: , Rfl:   •  dutasteride (AVODART) 0.5 MG capsule, 0.5 mg Daily., Disp: , Rfl:   •  Empagliflozin-Metformin HCl 5-500 MG tablet, Take  by mouth 2 (Two) Times a Day., Disp: , Rfl:   •   EPINEPHrine (EPIPEN) 0.3 MG/0.3ML solution auto-injector injection, Injection and upper thigh for severe allergic reaction, Disp: 1 each, Rfl: 1  •  fluticasone (FLONASE) 50 MCG/ACT nasal spray, 2 sprays into the nostril(s) as directed by provider Daily., Disp: 1 bottle, Rfl: 0  •  glucose blood test strip, USE AS DIRECTED TWO TIMES A DAY, Disp: , Rfl:   •  HYDROcodone-acetaminophen (VICODIN) 7.5-500 MG per tablet, Take  by mouth. Unconfirmed, Disp: , Rfl:   •  lisinopril (PRINIVIL,ZESTRIL) 5 MG tablet, Take 1 tablet by mouth Daily., Disp: 30 tablet, Rfl: 1  •  melatonin 5 MG tablet tablet, Take 1 tablet by mouth At Night As Needed for Sleep., Disp: 30 each, Rfl: 1  •  metoprolol succinate XL (TOPROL-XL) 25 MG 24 hr tablet, Take 1 tablet by mouth Daily., Disp: 30 tablet, Rfl: 1  •  multivitamin with minerals tablet tablet, Take 1 tablet by mouth Daily., Disp: , Rfl:   •  nitroglycerin (NITROSTAT) 0.4 MG SL tablet, Place 1 tablet under the tongue Every 5 (Five) Minutes. Up to 3 times per day, Disp: , Rfl:   •  omeprazole (priLOSEC) 20 MG capsule, Take 20 mg by mouth As Needed., Disp: , Rfl:   •  ONE TOUCH ULTRA TEST test strip, , Disp: , Rfl: 12  •  prasugrel (EFFIENT) 10 MG tablet, Take 1 tablet by mouth Daily., Disp: 30 tablet, Rfl: 1  •  Probiotic Product (PROBIOTIC DAILY PO), Take  by mouth As Needed., Disp: , Rfl:   •  rosuvastatin (CRESTOR) 40 MG tablet, TAKE 1 TABLET BY MOUTH DAILY., Disp: 90 tablet, Rfl: 3  •  sildenafil (VIAGRA) 100 MG tablet, Take 50 mg by mouth Daily As Needed for Erectile Dysfunction., Disp: , Rfl:   •  traMADol (ULTRAM) 50 MG tablet, Take 50 mg by mouth Every 6 (Six) Hours As Needed for Moderate Pain ., Disp: , Rfl:   •  Trulicity 3 MG/0.5ML solution pen-injector, INJECT 3 MG INTO THE SKIN EVERY 7 DAYS., Disp: , Rfl:     Physical Exam:  Vitals:    06/01/22 0859   BP: 114/72   BP Location: Left arm   Patient Position: Sitting   Cuff Size: Adult   Pulse: 88   SpO2: 98%   Weight: 90.7 kg  "(200 lb)   Height: 170.2 cm (67\")   PainSc: 0-No pain     Current Pain Level: none  Pulse Ox: Normal  on room air  General: alert, appears stated age and cooperative     Body Habitus: well-nourished    HEENT: Head: Normocephalic, no lesions, without obvious abnormality. No arcus senilis, xanthelasma or xanthomas.    Neuro: alert, oriented x3  Pulses: 2+ and symmetric  JVP: Volume/Pulsation: Normal.  Normal waveforms.   Appropriate inspiratory decrease.  No Kussmaul's. No Radha's.   Carotid Exam: no bruit normal pulsation bilaterally   Carotid Volume: normal.     Respirations: no increased work of breathing   Chest:  Normal    Pulmonary:Normal   Precordium: Normal impulses. P2 is not palpable.  RV Heave: absent  LV Heave: absent  [place]:  normal size and placement  Palpable S4: absent.  Heart rate: normal    Heart Rhythm: regular     Heart Sounds: S1: normal  S2: normal  S3: absent   S4: absent  Opening Snap: absent    Pericardial Rub:  Absent: .    Abdomen:   Appearance: normal .  Palpation: Soft, non-tender to palpation, bowel sounds positive in all four quadrants; no guarding or rebound tenderness  Extremity: no edema.   LE Skin: no rashes  LE Hair:  normal  LE Pulses: well perfused with normal pulses in the distal extremities  Pallor on elevation: Absent. Rubor on dependency: None      DATA REVIEWED:       ECG/EMG Results (all)     None        ---------------------------------------------------  TTE/DUDLEY:  Results for orders placed during the hospital encounter of 02/12/21    Adult Transthoracic Echo Complete W/ Cont if Necessary Per Protocol    Interpretation Summary  · Left ventricular wall thickness is consistent with mild concentric hypertrophy.  · Estimated left ventricular EF = 56% Left ventricular ejection fraction appears to be 56 - 60%. Left ventricular systolic function is normal.  · Left ventricular diastolic function is consistent with (grade I) impaired relaxation.  · The right ventricular cavity is " borderline dilated.  · Estimated right ventricular systolic pressure from tricuspid regurgitation is normal (<35 mmHg).          --------------------------------------------------------------------------------------------------  LABS:     The CVD Risk score (Lauren et al., 2008) failed to calculate for the following reasons:    The patient has a prior MI, stroke, CHF, or peripheral vascular disease diagnosis         Lab Results   Component Value Date    GLUCOSE 149 (H) 08/12/2020    BUN 14 01/24/2022    CREATININE 0.9 01/24/2022    EGFRIFNONA 97 08/12/2020    EGFRIFAFRI 103 06/11/2021    BCR 13.6 08/12/2020    K 4.4 01/24/2022    CO2 28 01/24/2022    CALCIUM 9.1 01/24/2022    ALBUMIN 4.3 01/24/2022    AST 19 01/24/2022    ALT 21 01/24/2022     Lab Results   Component Value Date    WBC 8.52 08/12/2020    HGB 15.5 08/12/2020    HCT 45.5 08/12/2020    MCV 94.2 08/12/2020     08/12/2020     Lab Results   Component Value Date    CHOL 81 11/05/2020    CHLPL 94 01/24/2022    TRIG 241 (H) 01/24/2022    HDL 18 (L) 01/24/2022    LDL 28 01/24/2022     Lab Results   Component Value Date    TSH 2.67 01/24/2022     Lab Results   Component Value Date    TROPONINT 0.017 08/11/2020     Lab Results   Component Value Date    HGBA1C 9.4 (H) 01/24/2022     No results found for: DDIMER  Lab Results   Component Value Date    ALT 21 01/24/2022     Lab Results   Component Value Date    HGBA1C 9.4 (H) 01/24/2022    HGBA1C 8.5 (H) 10/18/2021    HGBA1C 8.3 (H) 06/11/2021     Lab Results   Component Value Date    CREATININE 0.9 01/24/2022     No results found for: IRON, TIBC, FERRITIN  No results found for: INR, PROTIME    Assessment/Plan     1.  Coronary artery disease:  Patient presented with unstable angina and subsequent abnormal stress test.  Cardiac cath showed obstructive disease in his mid LAD for which he underwent successful PCI guided by intravascular ultrasound without any complications 08/2020.  He did have obstructive  disease in the diagonal and a small OM which was treated conservatively.  On aspirin and Effient, since has been 12 months.  Stopped aspirin, continue Effient.  For his dental work he can stop his Effient 3 days before and resume soon as possible.  He potentially has shoulder surgery coming up, advised him to hold off until August.  Cardiac rehab  Echocardiogram showed mildly reduced EF with hypokinesis in the LAD territory.  Report occasional heaviness in his chest when he gets up in the morning, started him on a PPI.  He has not been taking it on a regular basis.  Advised him to take it on regular basis.  We will also start him on Ranexa for residual disease in his diagonal and other small vessels.  If recurrence of exertional chest pain then will consider repeating a stress test.     2.  Congestive heart failure with recovered ejection fraction:  EF of 36 to 40% with hypokinesis in the LAD territory.  repeat echo 02/2021 with normal LV systolic function.   Continue metoprolol XL.  Continue lisinopril 5 mg.  Appears euvolemic and filling pressures are normal no need for diuresis at this point.  Not much room in uptitration for heart failure therapy  On SGLT-2        3.  Hyperlipidemia:  Uptitrated Crestor to 40 mg.  LDL <70.       4.  Diabetes:  Being managed with primary care    Cardiac Risk Estimation*: per the Revised Cardiac Risk Index (Circ. 100:1043, 1999), the patient's risk factors for cardiac complications history of ischemic heart disease place the patient in: RCI RISK CLASS II (1 risk factor, risk of major cardiac compl. appr. 1.3%). The patient is currently Symptomatic  Asymptomatic from a cardiovascular standpoint. The patient's current cardiovascular disease(s) are medically optimized. The patient is currently endorsing ability to achieve >4 METS. At the discretion of the operating physician, I do think the patient is of acceptable risk to proceed with surgery.     Pre-Op Evaluation Plan  1.  "Preoperative workup as follows none.  2. Change in medication regimen before surgery: Can hold Effient for 3 days before surgery.  3. Prophylaxis for cardiac events with perioperative beta-blockers for > three RCRI risks: Already on beta-blocker.  4. Deep vein thrombosis prophylaxis: Indicated, per surgeon.  5. Intra-op body temperature: Normothermia recommended in noncardiac surgery.    *Cardiovascular Risk Estimates provided by the RCRI are provisional and no guarantee of outcome. \"Surgical clearance\" is not provided. Only  risk assessment and management options are performed.  The final decision for operative intervention is at the discretion of the operating physician. This office follows the 2014 ACCF guidelines for preoperative risk evaluation.          Patient's Body mass index is 31.04 kg/m². BMI is above normal parameters. Recommendations include: exercise counseling and nutrition counseling.        Wood Peter Colbert Jr.  reports that he has quit smoking. He does not have any smokeless tobacco history on file..        AAA Screening:             This document has been electronically signed by Vanessa Crespo MD on June 1, 2022 09:18 CDT              "

## 2022-06-09 LAB
QT INTERVAL: 348 MS
QTC INTERVAL: 421 MS

## 2022-09-26 RX ORDER — RANOLAZINE 500 MG/1
500 TABLET, EXTENDED RELEASE ORAL 2 TIMES DAILY
Qty: 60 TABLET | Refills: 11 | Status: SHIPPED | OUTPATIENT
Start: 2022-09-26

## 2022-11-17 ENCOUNTER — OFFICE VISIT (OUTPATIENT)
Dept: CARDIOLOGY | Facility: CLINIC | Age: 62
End: 2022-11-17

## 2022-11-17 VITALS
OXYGEN SATURATION: 100 % | SYSTOLIC BLOOD PRESSURE: 144 MMHG | WEIGHT: 196.2 LBS | BODY MASS INDEX: 30.79 KG/M2 | HEIGHT: 67 IN | DIASTOLIC BLOOD PRESSURE: 80 MMHG | HEART RATE: 79 BPM

## 2022-11-17 DIAGNOSIS — I25.83 CORONARY ARTERY DISEASE DUE TO LIPID RICH PLAQUE: ICD-10-CM

## 2022-11-17 DIAGNOSIS — I25.10 CORONARY ARTERY DISEASE DUE TO LIPID RICH PLAQUE: ICD-10-CM

## 2022-11-17 DIAGNOSIS — I10 HYPERTENSION, ESSENTIAL: Primary | ICD-10-CM

## 2022-11-17 DIAGNOSIS — E78.2 HYPERLIPEMIA, MIXED: ICD-10-CM

## 2022-11-17 PROCEDURE — 99214 OFFICE O/P EST MOD 30 MIN: CPT | Performed by: INTERNAL MEDICINE

## 2022-11-17 NOTE — PROGRESS NOTES
[facility] Cardiology  OFFICE NOTE    Cardiovascular Medicine  Vanessa Crespo M.D., RPVI         No referring provider defined for this encounter.    Thank you for asking me to see Wood Colbert Jr. for CAD.    This is a 62 y.o. male with:    1.  Coronary artery disease status post PCI to mid LAD with 2.5 x 33 mm Xience stent in August 2020  2.  Congestive heart failure with reduced ejection fraction  3.  Hyperlipidemia  4.  Diabetes    Wood Colbert Jr. is a 62 y.o. male who presents for consultation today.  Patient was admitted to the hospital back in August 2020 with intermittent chest pain.  Patient had nuclear stress surgery significant ischemia anterior wall.  Subsequent cardiac cath showed obstructive disease in his mid LAD.  Patient with successful PCI without any complications.    Has recovered well.  Reports significant improvement in his functional capacity without any further episodes of chest pain.    11/17/2022:  Has been doing well.  Denying exertional chest pain with the activities of daily living.  Occasionally if he overexerts himself he would have chest fullness.  Symptoms did get significantly improved after adding Ranexa.  Has not used nitroglycerin since last visit.    03/08/2021:  No acute issues since last visit.  Patient reported significant improvement in his quality of life.  Denying any chest pain or shortness of breath.  He does have chronic shoulder pain and takes the meloxicam for them, I did advise him to  His primary care physician for alternatives to avoid NSAIDs with.    09/24/2021:  Reported occasionally when he gets up in the morning he will have some heaviness which gets better when he gets up and walk around.  He is able to perform actively at his work without any limitations from chest pain or shortness of breath.  He has dental work and surgery coming up.    06/01/2022:  Has done well since last visit.  He continues to have subcostal pain when he gets up in  the morning.  Denying any chest pain with exertion.  He had typical symptoms with his coronary artery disease previously.      Review of Systems - Constitution: Negative for weakness, weight gain and weight loss.   HENT: Negative for congestion.    Eyes: Negative for blurred vision.   Cardiovascular: As mentioned above  Respiratory: Negative for cough and hemoptysis.    Endocrine: Negative for polydipsia and polyuria.   Hematologic/Lymphatic: Negative for bleeding problem. Does not bruise/bleed easily.   Skin: Negative for flushing.   Musculoskeletal: Negative for neck pain and stiffness.   Gastrointestinal: Negative for abdominal pain, diarrhea, jaundice, melena, nausea and vomiting.   Genitourinary: Negative for dysuria and hematuria.   Neurological: Negative for dizziness, focal weakness and numbness.   Psychiatric/Behavioral: Negative for altered mental status and depression.     I reviewed the ROS as documented here and confirmed the accuracy of it with the patient today. 11/17/2022        All other systems were reviewed and were negative.    family history includes COPD in his father; Cancer in his brother, brother, daughter, mother, and sister; Diabetes in his father and mother; Heart disease in his father and mother; Other in his daughter.     reports that he has quit smoking. He has never used smokeless tobacco. He reports current alcohol use of about 2.0 standard drinks per week. He reports current drug use. Drug: Marijuana.    Allergies   Allergen Reactions   • Lipitor [Atorvastatin] Other (See Comments)     Chest Pains  Chest pain, SOB   • Latex Rash         Current Outpatient Medications:   •  albuterol (PROVENTIL HFA;VENTOLIN HFA) 108 (90 BASE) MCG/ACT inhaler, Inhale 2 puffs Every 6 (Six) Hours As Needed for Wheezing. Unconfirmed, Disp: , Rfl:   •  busPIRone (BUSPAR) 5 MG tablet, Take 5 mg by mouth As Needed., Disp: , Rfl: 11  •  cetirizine (ZyrTEC) 10 MG tablet, Take 10 mg by mouth Daily. 1 tablet  Unconfirmed, Disp: , Rfl:   •  diazePAM (VALIUM) 5 MG tablet, Take 5 mg by mouth 2 (Two) Times a Day As Needed for Anxiety., Disp: , Rfl:   •  dutasteride (AVODART) 0.5 MG capsule, 0.5 mg Daily., Disp: , Rfl:   •  Empagliflozin-Metformin HCl 5-500 MG tablet, Take  by mouth 2 (Two) Times a Day., Disp: , Rfl:   •  EPINEPHrine (EPIPEN) 0.3 MG/0.3ML solution auto-injector injection, Injection and upper thigh for severe allergic reaction, Disp: 1 each, Rfl: 1  •  fluticasone (FLONASE) 50 MCG/ACT nasal spray, 2 sprays into the nostril(s) as directed by provider Daily., Disp: 1 bottle, Rfl: 0  •  glucose blood test strip, USE AS DIRECTED TWO TIMES A DAY, Disp: , Rfl:   •  HYDROcodone-acetaminophen (VICODIN) 7.5-500 MG per tablet, Take  by mouth. Unconfirmed, Disp: , Rfl:   •  lisinopril (PRINIVIL,ZESTRIL) 5 MG tablet, Take 1 tablet by mouth Daily., Disp: 30 tablet, Rfl: 1  •  melatonin 5 MG tablet tablet, Take 1 tablet by mouth At Night As Needed for Sleep., Disp: 30 each, Rfl: 1  •  metoprolol succinate XL (TOPROL-XL) 25 MG 24 hr tablet, Take 1 tablet by mouth Daily., Disp: 30 tablet, Rfl: 1  •  multivitamin with minerals tablet tablet, Take 1 tablet by mouth Daily., Disp: , Rfl:   •  nitroglycerin (NITROSTAT) 0.4 MG SL tablet, Place 1 tablet under the tongue Every 5 (Five) Minutes. Up to 3 times per day, Disp: , Rfl:   •  omeprazole (priLOSEC) 20 MG capsule, Take 20 mg by mouth As Needed., Disp: , Rfl:   •  ONE TOUCH ULTRA TEST test strip, , Disp: , Rfl: 12  •  prasugrel (EFFIENT) 10 MG tablet, Take 1 tablet by mouth Daily., Disp: 30 tablet, Rfl: 1  •  Probiotic Product (PROBIOTIC DAILY PO), Take  by mouth As Needed., Disp: , Rfl:   •  ranolazine (RANEXA) 500 MG 12 hr tablet, TAKE 1 TABLET BY MOUTH 2 (TWO) TIMES A DAY., Disp: 60 tablet, Rfl: 11  •  rosuvastatin (CRESTOR) 40 MG tablet, TAKE 1 TABLET BY MOUTH DAILY., Disp: 90 tablet, Rfl: 3  •  sildenafil (VIAGRA) 100 MG tablet, Take 50 mg by mouth Daily As Needed for  "Erectile Dysfunction., Disp: , Rfl:   •  traMADol (ULTRAM) 50 MG tablet, Take 50 mg by mouth Every 6 (Six) Hours As Needed for Moderate Pain ., Disp: , Rfl:   •  Trulicity 3 MG/0.5ML solution pen-injector, INJECT 3 MG INTO THE SKIN EVERY 7 DAYS., Disp: , Rfl:     Physical Exam:  Vitals:    11/17/22 0814   BP: 144/80   BP Location: Left arm   Patient Position: Sitting   Cuff Size: Adult   Pulse: 79   SpO2: 100%   Weight: 89 kg (196 lb 3.2 oz)   Height: 170.2 cm (67\")   PainSc: 0-No pain   PainLoc: Chest     Current Pain Level: none  Pulse Ox: Normal  on room air  General: alert, appears stated age and cooperative     Body Habitus: well-nourished    HEENT: Head: Normocephalic, no lesions, without obvious abnormality. No arcus senilis, xanthelasma or xanthomas.    Neuro: alert, oriented x3  Pulses: 2+ and symmetric  JVP: Volume/Pulsation: Normal.  Normal waveforms.   Appropriate inspiratory decrease.  No Kussmaul's. No Radha's.   Carotid Exam: no bruit normal pulsation bilaterally   Carotid Volume: normal.     Respirations: no increased work of breathing   Chest:  Normal    Pulmonary:Normal   Precordium: Normal impulses. P2 is not palpable.  RV Heave: absent  LV Heave: absent  [place]:  normal size and placement  Palpable S4: absent.  Heart rate: normal    Heart Rhythm: regular     Heart Sounds: S1: normal  S2: normal  S3: absent   S4: absent  Opening Snap: absent    Pericardial Rub:  Absent: .    Abdomen:   Appearance: normal .  Palpation: Soft, non-tender to palpation, bowel sounds positive in all four quadrants; no guarding or rebound tenderness  Extremity: no edema.   LE Skin: no rashes  LE Hair:  normal  LE Pulses: well perfused with normal pulses in the distal extremities  Pallor on elevation: Absent. Rubor on dependency: None    I have reexamined the patient and the results are consistent with the previously documented exam. Vanessa Crespo MD       DATA REVIEWED:       ECG/EMG Results (all)     None    "     ---------------------------------------------------  TTE/DUDLEY:  Results for orders placed during the hospital encounter of 02/12/21    Adult Transthoracic Echo Complete W/ Cont if Necessary Per Protocol    Interpretation Summary  · Left ventricular wall thickness is consistent with mild concentric hypertrophy.  · Estimated left ventricular EF = 56% Left ventricular ejection fraction appears to be 56 - 60%. Left ventricular systolic function is normal.  · Left ventricular diastolic function is consistent with (grade I) impaired relaxation.  · The right ventricular cavity is borderline dilated.  · Estimated right ventricular systolic pressure from tricuspid regurgitation is normal (<35 mmHg).          --------------------------------------------------------------------------------------------------  LABS:     The CVD Risk score (Lauren, et al., 2008) failed to calculate for the following reasons:    The patient has a prior MI, stroke, CHF, or peripheral vascular disease diagnosis         Lab Results   Component Value Date    GLUCOSE 149 (H) 08/12/2020    BUN 15 06/01/2022    CREATININE 0.9 06/01/2022    EGFRIFNONA 97 08/12/2020    EGFRIFAFRI 103 06/11/2021    BCR 13.6 08/12/2020    K 4.5 06/01/2022    CO2 28 06/01/2022    CALCIUM 9.2 06/01/2022    ALBUMIN 4.3 06/01/2022    AST 15 06/01/2022    ALT 17 06/01/2022     Lab Results   Component Value Date    WBC 8.52 08/12/2020    HGB 15.5 08/12/2020    HCT 45.5 08/12/2020    MCV 94.2 08/12/2020     08/12/2020     Lab Results   Component Value Date    CHOL 81 11/05/2020    CHLPL 99 06/01/2022    TRIG 205 (H) 06/01/2022    HDL 24 06/01/2022    LDL 34 06/01/2022     Lab Results   Component Value Date    TSH 2.28 06/01/2022     Lab Results   Component Value Date    TROPONINT 0.017 08/11/2020     Lab Results   Component Value Date    HGBA1C 8.6 (H) 06/01/2022     No results found for: DDIMER  Lab Results   Component Value Date    ALT 17 06/01/2022     Lab Results    Component Value Date    HGBA1C 8.6 (H) 06/01/2022    HGBA1C 9.4 (H) 01/24/2022    HGBA1C 8.5 (H) 10/18/2021     Lab Results   Component Value Date    CREATININE 0.9 06/01/2022     No results found for: IRON, TIBC, FERRITIN  No results found for: INR, PROTIME    Assessment/Plan     1.  Coronary artery disease:  Patient presented with unstable angina and subsequent abnormal stress test.  Cardiac cath showed obstructive disease in his mid LAD for which he underwent successful PCI guided by intravascular ultrasound without any complications 08/2020.  He did have obstructive disease in the diagonal and a small OM which was treated conservatively.  On aspirin and Effient, since has been 12 months.  Stopped aspirin, continue Effient.  For his dental work he can stop his Effient 3 days before and resume soon as possible.  He potentially has shoulder surgery coming up, advised him to hold off until August.  Cardiac rehab  Echocardiogram showed mildly reduced EF with hypokinesis in the LAD territory.  Symptoms have improved after adding PPI and Ranexa.  Denying any lifestyle angina at this point.  If worsening symptoms will consider repeat cardiac cath and potential PCI to his diagonal.     2.  Congestive heart failure with recovered ejection fraction:  EF of 36 to 40% with hypokinesis in the LAD territory.  repeat echo 02/2021 with normal LV systolic function.   Continue metoprolol XL.  Continue lisinopril 5 mg.  Appears euvolemic and filling pressures are normal no need for diuresis at this point.  Not much room in uptitration for heart failure therapy  On SGLT-2        3.  Hyperlipidemia:  Uptitrated Crestor to 40 mg.  LDL <70.       4.  Diabetes:  Being managed with primary care            Patient's Body mass index is 31.04 kg/m². BMI is above normal parameters. Recommendations include: exercise counseling and nutrition counseling.        Wood Colbert Jr.  reports that he has quit smoking. He does not have any smokeless  tobacco history on file..        AAA Screening:             This document has been electronically signed by Vanessa Crespo MD on November 17, 2022 08:24 CST

## 2023-05-17 ENCOUNTER — OFFICE VISIT (OUTPATIENT)
Dept: CARDIOLOGY | Facility: CLINIC | Age: 63
End: 2023-05-17
Payer: COMMERCIAL

## 2023-05-17 VITALS
BODY MASS INDEX: 29.64 KG/M2 | SYSTOLIC BLOOD PRESSURE: 110 MMHG | HEIGHT: 69 IN | WEIGHT: 200.1 LBS | DIASTOLIC BLOOD PRESSURE: 64 MMHG | HEART RATE: 85 BPM | OXYGEN SATURATION: 98 %

## 2023-05-17 DIAGNOSIS — I10 HYPERTENSION, ESSENTIAL: ICD-10-CM

## 2023-05-17 DIAGNOSIS — I25.10 CORONARY ARTERY DISEASE INVOLVING NATIVE CORONARY ARTERY OF NATIVE HEART WITHOUT ANGINA PECTORIS: Primary | ICD-10-CM

## 2023-05-17 DIAGNOSIS — R07.9 CHEST PAIN DUE TO GERD: ICD-10-CM

## 2023-05-17 DIAGNOSIS — K21.9 CHEST PAIN DUE TO GERD: ICD-10-CM

## 2023-05-17 DIAGNOSIS — E78.2 HYPERLIPEMIA, MIXED: ICD-10-CM

## 2023-05-17 RX ORDER — NITROGLYCERIN 0.4 MG/1
0.4 TABLET SUBLINGUAL
Qty: 90 TABLET | Refills: 3 | Status: SHIPPED | OUTPATIENT
Start: 2023-05-17

## 2023-05-17 RX ORDER — FAMOTIDINE 20 MG/1
20 TABLET, FILM COATED ORAL AS NEEDED
COMMUNITY

## 2023-05-17 NOTE — PROGRESS NOTES
[facility] Cardiology  OFFICE NOTE    Cardiovascular Medicine  Vanessa Crespo M.D., RPVI         No referring provider defined for this encounter.    Thank you for asking me to see Wood Colbert Jr. for CAD.    This is a 63 y.o. male with:    1.  Coronary artery disease status post PCI to mid LAD with 2.5 x 33 mm Xience stent in August 2020  2.  Congestive heart failure with reduced ejection fraction  3.  Hyperlipidemia  4.  Diabetes    Wood Colbert Jr. is a 63 y.o. male who presents for consultation today.  Patient was admitted to the hospital back in August 2020 with intermittent chest pain.  Patient had nuclear stress surgery significant ischemia anterior wall.  Subsequent cardiac cath showed obstructive disease in his mid LAD.  Patient underwent successful PCI without any complications.    Has recovered well.  Reports significant improvement in his functional capacity without any further episodes of chest pain.    05/17/2023:  Continues to have intermittent epigastric discomfort.  Nonexertional.  Occasionally radiates to his neck.    11/17/2022:  Has been doing well.  Denying exertional chest pain with the activities of daily living.  Occasionally if he overexerts himself he would have chest fullness.  Symptoms did get significantly improved after adding Ranexa.  Has not used nitroglycerin since last visit.    03/08/2021:  No acute issues since last visit.  Patient reported significant improvement in his quality of life.  Denying any chest pain or shortness of breath.  He does have chronic shoulder pain and takes the meloxicam for them, I did advise him to  His primary care physician for alternatives to avoid NSAIDs with.    09/24/2021:  Reported occasionally when he gets up in the morning he will have some heaviness which gets better when he gets up and walk around.  He is able to perform actively at his work without any limitations from chest pain or shortness of breath.  He has dental work  and surgery coming up.    06/01/2022:  Has done well since last visit.  He continues to have subcostal pain when he gets up in the morning.  Denying any chest pain with exertion.  He had typical symptoms with his coronary artery disease previously.      Review of Systems - Constitution: Negative for weakness, weight gain and weight loss.   HENT: Negative for congestion.    Eyes: Negative for blurred vision.   Cardiovascular: As mentioned above  Respiratory: Negative for cough and hemoptysis.    Endocrine: Negative for polydipsia and polyuria.   Hematologic/Lymphatic: Negative for bleeding problem. Does not bruise/bleed easily.   Skin: Negative for flushing.   Musculoskeletal: Negative for neck pain and stiffness.   Gastrointestinal: Negative for abdominal pain, diarrhea, jaundice, melena, nausea and vomiting.   Genitourinary: Negative for dysuria and hematuria.   Neurological: Negative for dizziness, focal weakness and numbness.   Psychiatric/Behavioral: Negative for altered mental status and depression.     I reviewed the ROS as documented here and confirmed the accuracy of it with the patient today. 5/17/2023        All other systems were reviewed and were negative.    family history includes COPD in his father; Cancer in his brother, brother, daughter, mother, and sister; Diabetes in his father and mother; Heart disease in his father and mother; Other in his daughter.     reports that he has quit smoking. He has never used smokeless tobacco. He reports current alcohol use of about 2.0 standard drinks per week. He reports current drug use. Drug: Marijuana.    Allergies   Allergen Reactions   • Lipitor [Atorvastatin] Other (See Comments)     Chest Pains  Chest pain, SOB   • Latex Rash         Current Outpatient Medications:   •  albuterol (PROVENTIL HFA;VENTOLIN HFA) 108 (90 BASE) MCG/ACT inhaler, Inhale 2 puffs Every 6 (Six) Hours As Needed for Wheezing. Unconfirmed, Disp: , Rfl:   •  busPIRone (BUSPAR) 5 MG  tablet, Take 1 tablet by mouth As Needed., Disp: , Rfl: 11  •  cetirizine (ZyrTEC) 10 MG tablet, Take 1 tablet by mouth Daily. 1 tablet Unconfirmed, Disp: , Rfl:   •  diazePAM (VALIUM) 10 MG tablet, TAKE 1 TABLET ONE HOUR PRIOR TO PROCEDURE, Disp: , Rfl:   •  dutasteride (AVODART) 0.5 MG capsule, 1 capsule Daily., Disp: , Rfl:   •  Empagliflozin-Metformin HCl 5-500 MG tablet, Take  by mouth 2 (Two) Times a Day., Disp: , Rfl:   •  EPINEPHrine (EPIPEN) 0.3 MG/0.3ML solution auto-injector injection, Injection and upper thigh for severe allergic reaction, Disp: 1 each, Rfl: 1  •  famotidine (PEPCID) 20 MG tablet, Take 1 tablet by mouth As Needed for Heartburn., Disp: , Rfl:   •  fluticasone (FLONASE) 50 MCG/ACT nasal spray, 2 sprays into the nostril(s) as directed by provider Daily., Disp: 1 bottle, Rfl: 0  •  glipizide (GLUCOTROL XL) 5 MG ER tablet, TAKE 1 TABLET (5 MG TOTAL) BY MOUTH EVERY MORNING., Disp: , Rfl:   •  glucose blood test strip, USE AS DIRECTED TWO TIMES A DAY, Disp: , Rfl:   •  HYDROcodone-acetaminophen (NORCO) 5-325 MG per tablet, Take  by mouth See Admin Instructions. Take 1 tablet by mouth every 4-6 hours as needed for pain, Disp: , Rfl:   •  lisinopril (PRINIVIL,ZESTRIL) 5 MG tablet, Take 1 tablet by mouth Daily., Disp: 30 tablet, Rfl: 1  •  metoprolol succinate XL (TOPROL-XL) 25 MG 24 hr tablet, Take 1 tablet by mouth Daily., Disp: 30 tablet, Rfl: 1  •  multivitamin with minerals tablet tablet, Take 1 tablet by mouth Daily., Disp: , Rfl:   •  nitroglycerin (NITROSTAT) 0.4 MG SL tablet, Place 1 tablet under the tongue Every 5 (Five) Minutes. Up to 3 times per day, Disp: , Rfl:   •  omeprazole (priLOSEC) 20 MG capsule, Take 1 capsule by mouth As Needed., Disp: , Rfl:   •  ONE TOUCH ULTRA TEST test strip, , Disp: , Rfl: 12  •  prasugrel (EFFIENT) 10 MG tablet, Take 1 tablet by mouth Daily., Disp: 30 tablet, Rfl: 1  •  Probiotic Product (PROBIOTIC DAILY PO), Take  by mouth As Needed., Disp: , Rfl:   •   "promethazine-dextromethorphan (PROMETHAZINE-DM) 6.25-15 MG/5ML syrup, Take 5 mL by mouth 4 (Four) Times a Day As Needed for Cough., Disp: 120 mL, Rfl: 0  •  ranolazine (RANEXA) 500 MG 12 hr tablet, TAKE 1 TABLET BY MOUTH 2 (TWO) TIMES A DAY., Disp: 60 tablet, Rfl: 11  •  rosuvastatin (CRESTOR) 40 MG tablet, TAKE 1 TABLET BY MOUTH DAILY., Disp: 90 tablet, Rfl: 3  •  sildenafil (VIAGRA) 100 MG tablet, Take 50 mg by mouth Daily As Needed for Erectile Dysfunction., Disp: , Rfl:   •  traMADol (ULTRAM) 50 MG tablet, Take 1 tablet by mouth Every 6 (Six) Hours As Needed for Moderate Pain., Disp: , Rfl:   •  Trulicity 3 MG/0.5ML solution pen-injector, INJECT 3 MG INTO THE SKIN EVERY 7 DAYS., Disp: , Rfl:     Physical Exam:  Vitals:    05/17/23 0803   BP: 110/64   BP Location: Left arm   Patient Position: Sitting   Cuff Size: Adult   Pulse: 85   SpO2: 98%   Weight: 90.8 kg (200 lb 1.6 oz)   Height: 175.3 cm (69\")   PainSc: 0-No pain   PainLoc: Chest     Current Pain Level: none  Pulse Ox: Normal  on room air  General: alert, appears stated age and cooperative     Body Habitus: well-nourished    HEENT: Head: Normocephalic, no lesions, without obvious abnormality. No arcus senilis, xanthelasma or xanthomas.    Neuro: alert, oriented x3  Pulses: 2+ and symmetric  JVP: Volume/Pulsation: Normal.  Normal waveforms.   Appropriate inspiratory decrease.  No Kussmaul's. No Radha's.   Carotid Exam: no bruit normal pulsation bilaterally   Carotid Volume: normal.     Respirations: no increased work of breathing   Chest:  Normal    Pulmonary:Normal   Precordium: Normal impulses. P2 is not palpable.  RV Heave: absent  LV Heave: absent  [place]:  normal size and placement  Palpable S4: absent.  Heart rate: normal    Heart Rhythm: regular     Heart Sounds: S1: normal  S2: normal  S3: absent   S4: absent  Opening Snap: absent    Pericardial Rub:  Absent: .    Abdomen:   Appearance: normal .  Palpation: Soft, non-tender to palpation, bowel " sounds positive in all four quadrants; no guarding or rebound tenderness  Extremity: no edema.   LE Skin: no rashes  LE Hair:  normal  LE Pulses: well perfused with normal pulses in the distal extremities  Pallor on elevation: Absent. Rubor on dependency: None    I have reexamined the patient and the results are consistent with the previously documented exam. Vanessa Crespo MD       DATA REVIEWED:       ECG/EMG Results (all)     None        ---------------------------------------------------  TTE/DUDLEY:  Results for orders placed during the hospital encounter of 02/12/21    Adult Transthoracic Echo Complete W/ Cont if Necessary Per Protocol    Interpretation Summary  · Left ventricular wall thickness is consistent with mild concentric hypertrophy.  · Estimated left ventricular EF = 56% Left ventricular ejection fraction appears to be 56 - 60%. Left ventricular systolic function is normal.  · Left ventricular diastolic function is consistent with (grade I) impaired relaxation.  · The right ventricular cavity is borderline dilated.  · Estimated right ventricular systolic pressure from tricuspid regurgitation is normal (<35 mmHg).          --------------------------------------------------------------------------------------------------  LABS:     The CVD Risk score (RICHARD'Agostino et al., 2008) failed to calculate for the following reasons:    The patient has a prior MI, stroke, CHF, or peripheral vascular disease diagnosis         Lab Results   Component Value Date    GLUCOSE 149 (H) 08/12/2020    BUN 11 12/14/2022    CREATININE 1.1 12/14/2022    EGFRIFNONA 97 08/12/2020    EGFRIFAFRI 103 06/11/2021    BCR 13.6 08/12/2020    K 4.5 12/14/2022    CO2 29 12/14/2022    CALCIUM 8.4 (L) 12/14/2022    ALBUMIN 4.1 12/14/2022    AST 15 12/14/2022    ALT 16 12/14/2022     Lab Results   Component Value Date    WBC 8.52 08/12/2020    HGB 15.5 08/12/2020    HCT 45.5 08/12/2020    MCV 94.2 08/12/2020     08/12/2020     Lab  Results   Component Value Date    CHOL 81 11/05/2020    CHLPL 96 12/14/2022    TRIG 138 12/14/2022    HDL 22 (L) 12/14/2022    LDL 46 12/14/2022     Lab Results   Component Value Date    TSH 2.45 12/14/2022     Lab Results   Component Value Date    TROPONINT 0.017 08/11/2020     Lab Results   Component Value Date    HGBA1C 8.7 (H) 12/14/2022     No results found for: DDIMER  Lab Results   Component Value Date    ALT 16 12/14/2022     Lab Results   Component Value Date    HGBA1C 8.7 (H) 12/14/2022    HGBA1C 8.6 (H) 06/01/2022    HGBA1C 9.4 (H) 01/24/2022     Lab Results   Component Value Date    CREATININE 1.1 12/14/2022     No results found for: IRON, TIBC, FERRITIN  No results found for: INR, PROTIME    Assessment/Plan     1.  Coronary artery disease:  Patient presented with unstable angina and subsequent abnormal stress test.  Cardiac cath showed obstructive disease in his mid LAD for which he underwent successful PCI guided by intravascular ultrasound without any complications 08/2020.  He did have obstructive disease in the diagonal and a small OM which was treated conservatively.  On aspirin and Effient, since has been 12 months.  Stopped aspirin, continue Effient.  For his dental work he can stop his Effient 3 days before and resume soon as possible.  He potentially has shoulder surgery coming up, advised him to hold off until August.  Cardiac rehab  Echocardiogram showed mildly reduced EF with hypokinesis in the LAD territory.  Symptoms have improved after adding PPI and Ranexa.  Denying any lifestyle angina at this point.  I am referring him to GI.  If no significant GI causes for his intermittent epigastric pain and continues to have symptoms can consider repeat cardiac cath for progression of disease.      2.  Congestive heart failure with recovered ejection fraction:  EF of 36 to 40% with hypokinesis in the LAD territory.  repeat echo 02/2021 with normal LV systolic function.   Continue metoprolol  XL.  Continue lisinopril 5 mg.  Appears euvolemic and filling pressures are normal no need for diuresis at this point.  Not much room in uptitration for heart failure therapy  On SGLT-2        3.  Hyperlipidemia:  Uptitrated Crestor to 40 mg.  LDL <70.       4.  Diabetes:  Being managed with primary care            Patient's Body mass index is 31.04 kg/m². BMI is above normal parameters. Recommendations include: exercise counseling and nutrition counseling.        Wood Colbert Jr.  reports that he has quit smoking. He does not have any smokeless tobacco history on file..        AAA Screening:             This document has been electronically signed by Vanessa Crespo MD on May 17, 2023 08:23 CDT

## 2023-05-18 LAB
QT INTERVAL: 358 MS
QTC INTERVAL: 426 MS

## 2023-05-26 LAB
QT INTERVAL: 358 MS
QTC INTERVAL: 426 MS

## 2023-09-18 ENCOUNTER — OFFICE VISIT (OUTPATIENT)
Dept: CARDIOLOGY | Facility: CLINIC | Age: 63
End: 2023-09-18
Payer: COMMERCIAL

## 2023-09-18 VITALS
HEIGHT: 69 IN | HEART RATE: 71 BPM | BODY MASS INDEX: 28.91 KG/M2 | OXYGEN SATURATION: 97 % | WEIGHT: 195.2 LBS | DIASTOLIC BLOOD PRESSURE: 72 MMHG | SYSTOLIC BLOOD PRESSURE: 126 MMHG

## 2023-09-18 DIAGNOSIS — E78.2 HYPERLIPEMIA, MIXED: ICD-10-CM

## 2023-09-18 DIAGNOSIS — I25.10 CORONARY ARTERY DISEASE INVOLVING NATIVE CORONARY ARTERY OF NATIVE HEART WITHOUT ANGINA PECTORIS: Primary | ICD-10-CM

## 2023-09-18 DIAGNOSIS — I10 HYPERTENSION, ESSENTIAL: ICD-10-CM

## 2023-09-18 PROCEDURE — 99214 OFFICE O/P EST MOD 30 MIN: CPT | Performed by: INTERNAL MEDICINE

## 2023-09-18 RX ORDER — ORAL SEMAGLUTIDE 7 MG/1
7 TABLET ORAL DAILY
COMMUNITY
Start: 2023-08-18

## 2023-09-18 NOTE — PROGRESS NOTES
[facility]  OFFICE NOTE    Cardiovascular Medicine  Vanessa Crespo M.D., RPVI         No referring provider defined for this encounter.    Thank you for asking me to see Wood Colbert Jr. for CAD.    This is a 63 y.o. male with:    1.  Coronary artery disease status post PCI to mid LAD with 2.5 x 33 mm Xience stent in August 2020  2.  Congestive heart failure with reduced ejection fraction  3.  Hyperlipidemia  4.  Diabetes    Wood Colbert Jr. is a 63 y.o. male who presents for consultation today.  Patient was admitted to the hospital back in August 2020 with intermittent chest pain.  Patient had nuclear stress significant ischemia anterior wall.  Subsequent cardiac cath showed obstructive disease in his mid LAD.  Patient underwent successful PCI without any complications.    Has recovered well.  Reports significant improvement in his functional capacity without any further episodes of chest pain.    09/18/2023:  Has done well since last visit.  Denying any chest pain or shortness of breath.  Working full-time and denies any chest pain with exertion.    05/17/2023:  Continues to have intermittent epigastric discomfort.  Nonexertional.  Occasionally radiates to his neck.    11/17/2022:  Has been doing well.  Denying exertional chest pain with the activities of daily living.  Occasionally if he overexerts himself he would have chest fullness.  Symptoms did get significantly improved after adding Ranexa.  Has not used nitroglycerin since last visit.    03/08/2021:  No acute issues since last visit.  Patient reported significant improvement in his quality of life.  Denying any chest pain or shortness of breath.  He does have chronic shoulder pain and takes the meloxicam for them, I did advise him to  His primary care physician for alternatives to avoid NSAIDs with.    09/24/2021:  Reported occasionally when he gets up in the morning he will have some heaviness which gets better when he gets  up and walk around.  He is able to perform actively at his work without any limitations from chest pain or shortness of breath.  He has dental work and surgery coming up.    06/01/2022:  Has done well since last visit.  He continues to have subcostal pain when he gets up in the morning.  Denying any chest pain with exertion.  He had typical symptoms with his coronary artery disease previously.      Review of Systems - Constitution: Negative for weakness, weight gain and weight loss.   HENT: Negative for congestion.    Eyes: Negative for blurred vision.   Cardiovascular: As mentioned above  Respiratory: Negative for cough and hemoptysis.    Endocrine: Negative for polydipsia and polyuria.   Hematologic/Lymphatic: Negative for bleeding problem. Does not bruise/bleed easily.   Skin: Negative for flushing.   Musculoskeletal: Negative for neck pain and stiffness.   Gastrointestinal: Negative for abdominal pain, diarrhea, jaundice, melena, nausea and vomiting.   Genitourinary: Negative for dysuria and hematuria.   Neurological: Negative for dizziness, focal weakness and numbness.   Psychiatric/Behavioral: Negative for altered mental status and depression.     I reviewed the ROS as documented here and confirmed the accuracy of it with the patient today. 9/18/2023        All other systems were reviewed and were negative.    family history includes COPD in his father; Cancer in his brother, brother, daughter, mother, and sister; Diabetes in his father and mother; Heart disease in his father and mother; Other in his daughter.     reports that he has quit smoking. He has never used smokeless tobacco. He reports current alcohol use of about 2.0 standard drinks per week. He reports current drug use. Drug: Marijuana.    Allergies   Allergen Reactions    Lipitor [Atorvastatin] Other (See Comments)     Chest Pains  Chest pain, SOB    Latex Rash         Current Outpatient Medications:     albuterol (PROVENTIL HFA;VENTOLIN HFA) 108  (90 BASE) MCG/ACT inhaler, Inhale 2 puffs Every 6 (Six) Hours As Needed for Wheezing. Unconfirmed, Disp: , Rfl:     busPIRone (BUSPAR) 5 MG tablet, Take 1 tablet by mouth As Needed., Disp: , Rfl: 11    cetirizine (ZyrTEC) 10 MG tablet, Take 1 tablet by mouth Daily. 1 tablet Unconfirmed, Disp: , Rfl:     diazePAM (VALIUM) 10 MG tablet, TAKE 1 TABLET ONE HOUR PRIOR TO PROCEDURE, Disp: , Rfl:     dutasteride (AVODART) 0.5 MG capsule, 1 capsule Daily., Disp: , Rfl:     Empagliflozin-Metformin HCl 5-500 MG tablet, Take  by mouth 2 (Two) Times a Day., Disp: , Rfl:     EPINEPHrine (EPIPEN) 0.3 MG/0.3ML solution auto-injector injection, Injection and upper thigh for severe allergic reaction, Disp: 1 each, Rfl: 1    famotidine (PEPCID) 20 MG tablet, Take 1 tablet by mouth As Needed for Heartburn., Disp: , Rfl:     fluticasone (FLONASE) 50 MCG/ACT nasal spray, 2 sprays into the nostril(s) as directed by provider Daily., Disp: 1 bottle, Rfl: 0    glipizide (GLUCOTROL XL) 5 MG ER tablet, TAKE 1 TABLET (5 MG TOTAL) BY MOUTH EVERY MORNING., Disp: , Rfl:     glucose blood test strip, USE AS DIRECTED TWO TIMES A DAY, Disp: , Rfl:     HYDROcodone-acetaminophen (NORCO) 5-325 MG per tablet, Take  by mouth See Admin Instructions. Take 1 tablet by mouth every 4-6 hours as needed for pain, Disp: , Rfl:     lisinopril (PRINIVIL,ZESTRIL) 5 MG tablet, Take 1 tablet by mouth Daily., Disp: 30 tablet, Rfl: 1    metoprolol succinate XL (TOPROL-XL) 25 MG 24 hr tablet, Take 1 tablet by mouth Daily., Disp: 30 tablet, Rfl: 1    multivitamin with minerals tablet tablet, Take 1 tablet by mouth Daily., Disp: , Rfl:     nitroglycerin (NITROSTAT) 0.4 MG SL tablet, Place 1 tablet under the tongue Every 5 (Five) Minutes. Up to 3 times per day, Disp: 90 tablet, Rfl: 3    omeprazole (priLOSEC) 20 MG capsule, Take 1 capsule by mouth As Needed., Disp: , Rfl:     ONE TOUCH ULTRA TEST test strip, , Disp: , Rfl: 12    prasugrel (EFFIENT) 10 MG tablet, Take 1  "tablet by mouth Daily., Disp: 30 tablet, Rfl: 1    Probiotic Product (PROBIOTIC DAILY PO), Take  by mouth As Needed., Disp: , Rfl:     promethazine-dextromethorphan (PROMETHAZINE-DM) 6.25-15 MG/5ML syrup, Take 5 mL by mouth 4 (Four) Times a Day As Needed for Cough., Disp: 120 mL, Rfl: 0    ranolazine (RANEXA) 500 MG 12 hr tablet, TAKE 1 TABLET BY MOUTH 2 (TWO) TIMES A DAY., Disp: 60 tablet, Rfl: 11    rosuvastatin (CRESTOR) 40 MG tablet, TAKE 1 TABLET BY MOUTH DAILY., Disp: 90 tablet, Rfl: 3    Rybelsus 7 MG tablet, Take 7 mg by mouth Daily., Disp: , Rfl:     sildenafil (VIAGRA) 100 MG tablet, Take 0.5 tablets by mouth Daily As Needed for Erectile Dysfunction., Disp: , Rfl:     traMADol (ULTRAM) 50 MG tablet, Take 1 tablet by mouth Every 6 (Six) Hours As Needed for Moderate Pain., Disp: , Rfl:     Trulicity 3 MG/0.5ML solution pen-injector, INJECT 3 MG INTO THE SKIN EVERY 7 DAYS., Disp: , Rfl:     Physical Exam:  Vitals:    09/18/23 0817   BP: 126/72   BP Location: Left arm   Patient Position: Sitting   Cuff Size: Adult   Pulse: 71   SpO2: 97%   Weight: 88.5 kg (195 lb 3.2 oz)   Height: 175.3 cm (69\")   PainSc: 0-No pain     Current Pain Level: none  Pulse Ox: Normal  on room air  General: alert, appears stated age and cooperative     Body Habitus: well-nourished    HEENT: Head: Normocephalic, no lesions, without obvious abnormality. No arcus senilis, xanthelasma or xanthomas.    Neuro: alert, oriented x3  Pulses: 2+ and symmetric  JVP: Volume/Pulsation: Normal.  Normal waveforms.   Appropriate inspiratory decrease.  No Kussmaul's. No Radha's.   Carotid Exam: no bruit normal pulsation bilaterally   Carotid Volume: normal.     Respirations: no increased work of breathing   Chest:  Normal    Pulmonary:Normal   Precordium: Normal impulses. P2 is not palpable.  RV Heave: absent  LV Heave: absent  [place]:  normal size and placement  Palpable S4: absent.  Heart rate: normal    Heart Rhythm: regular     Heart Sounds: S1: " normal  S2: normal  S3: absent   S4: absent  Opening Snap: absent    Pericardial Rub:  Absent: .    Abdomen:   Appearance: normal .  Palpation: Soft, non-tender to palpation, bowel sounds positive in all four quadrants; no guarding or rebound tenderness  Extremity: no edema.   LE Skin: no rashes  LE Hair:  normal  LE Pulses: well perfused with normal pulses in the distal extremities  Pallor on elevation: Absent. Rubor on dependency: None    I have reexamined the patient and the results are consistent with the previously documented exam. Vanessa Crespo MD       DATA REVIEWED:       ECG/EMG Results (all)       None          ---------------------------------------------------  TTE/DUDLEY:  Results for orders placed during the hospital encounter of 02/12/21    Adult Transthoracic Echo Complete W/ Cont if Necessary Per Protocol    Interpretation Summary  · Left ventricular wall thickness is consistent with mild concentric hypertrophy.  · Estimated left ventricular EF = 56% Left ventricular ejection fraction appears to be 56 - 60%. Left ventricular systolic function is normal.  · Left ventricular diastolic function is consistent with (grade I) impaired relaxation.  · The right ventricular cavity is borderline dilated.  · Estimated right ventricular systolic pressure from tricuspid regurgitation is normal (<35 mmHg).          --------------------------------------------------------------------------------------------------  LABS:     The CVD Risk score (RICHARD'Gerber, et al., 2008) failed to calculate for the following reasons:    The patient has a prior MI, stroke, CHF, or peripheral vascular disease diagnosis         Lab Results   Component Value Date    GLUCOSE 149 (H) 08/12/2020    BUN 12 06/15/2023    CREATININE 1.1 06/15/2023    EGFRIFNONA 97 08/12/2020    EGFRIFAFRI 103 06/11/2021    BCR 13.6 08/12/2020    K 4.3 06/15/2023    CO2 28 06/15/2023    CALCIUM 8.8 06/15/2023    ALBUMIN 4.2 06/15/2023    AST 15 06/15/2023    ALT  15 06/15/2023     Lab Results   Component Value Date    WBC 8.52 08/12/2020    HGB 15.5 08/12/2020    HCT 45.5 08/12/2020    MCV 94.2 08/12/2020     08/12/2020     Lab Results   Component Value Date    CHOL 81 11/05/2020    CHLPL 112 06/15/2023    TRIG 216 (H) 06/15/2023    HDL 23 06/15/2023    LDL 46 06/15/2023     Lab Results   Component Value Date    TSH 2.74 06/15/2023     Lab Results   Component Value Date    TROPONINT 0.017 08/11/2020     Lab Results   Component Value Date    HGBA1C 8.2 (H) 06/15/2023     No results found for: DDIMER  Lab Results   Component Value Date    ALT 15 06/15/2023     Lab Results   Component Value Date    HGBA1C 8.2 (H) 06/15/2023    HGBA1C 8.7 (H) 12/14/2022    HGBA1C 8.6 (H) 06/01/2022     Lab Results   Component Value Date    CREATININE 1.1 06/15/2023     No results found for: IRON, TIBC, FERRITIN  No results found for: INR, PROTIME    Assessment/Plan     1.  Coronary artery disease:  Patient presented with unstable angina and subsequent abnormal stress test.  Cardiac cath showed obstructive disease in his mid LAD for which he underwent successful PCI guided by intravascular ultrasound without any complications 08/2020.  He did have obstructive disease in the diagonal and a small OM which was treated conservatively.  On aspirin and Effient, since has been 12 months.  Stopped aspirin, continue Effient.    He potentially has shoulder surgery coming up, advised him to hold off until August.  Cardiac rehab  Echocardiogram showed mildly reduced EF with hypokinesis in the LAD territory.  Repeat echocardiogram for normal EF.  Symptoms have improved after adding PPI and Ranexa.          2.  Congestive heart failure with recovered ejection fraction:  EF of 36 to 40% with hypokinesis in the LAD territory.  repeat echo 02/2021 with normal LV systolic function.   Continue metoprolol XL.  Continue lisinopril 5 mg.  Appears euvolemic and filling pressures are normal no need for diuresis  at this point.  Not much room in uptitration for heart failure therapy  On SGLT-2        3.  Hyperlipidemia:  Uptitrated Crestor to 40 mg.  LDL <70.       4.  Diabetes:  Being managed with primary care            Patient's Body mass index is 31.04 kg/m². BMI is above normal parameters. Recommendations include: exercise counseling and nutrition counseling.        Wood Colbert Jr.  reports that he has quit smoking. He does not have any smokeless tobacco history on file..        AAA Screening:             This document has been electronically signed by Vanessa Crespo MD on September 18, 2023 08:31 CDT

## 2023-09-20 RX ORDER — RANOLAZINE 500 MG/1
500 TABLET, EXTENDED RELEASE ORAL 2 TIMES DAILY
Qty: 60 TABLET | Refills: 11 | Status: SHIPPED | OUTPATIENT
Start: 2023-09-20

## (undated) DEVICE — RUNTHROUGH NS EXTRA FLOPPY PTCA GUIDEWIRE: Brand: RUNTHROUGH

## (undated) DEVICE — DEV INFL MONARCH 20ML

## (undated) DEVICE — X-DRAPE ABS 12"X17" .25MM LEAD EQUIV STERILE X-RAY SHIELD 10/BOX: Brand: X-DRAPE

## (undated) DEVICE — NC TREK CORONARY DILATATION CATHETER 3.5 MM X 15 MM / RAPID-EXCHANGE: Brand: NC TREK

## (undated) DEVICE — COPILOT KIT INCLUDES BLEEDBACK CONTROL VALVE / GUIDE WIRE INTRODUCER / TORQUE DEVICE: Brand: ACCESSORIES

## (undated) DEVICE — GW PERIPH GUIDERIGHT STD/J/TP PTFE/PCOAT SS 0.038IN 5X150CM

## (undated) DEVICE — BALN PTCA TAKERU RX 2X8MM

## (undated) DEVICE — GW PERIPH GUIDERIGHT STD/EXCHNG/J/TIP SS 0.038IN 5X260CM

## (undated) DEVICE — CATH DIAG EXPO M/ PK 6FR FL4/FR4 PIG 3PK

## (undated) DEVICE — A2000 MULTI-USE SYRINGE KIT, P/N 701277-003KIT CONTENTS: 100ML CONTRAST RESERVOIR AND TUBING WITH CONTRAST SPIKE AND CLAMP: Brand: A2000 MULTI-USE SYRINGE KIT

## (undated) DEVICE — GLIDESHEATH SLENDER STAINLESS STEEL KIT: Brand: GLIDESHEATH SLENDER

## (undated) DEVICE — TR BAND RADIAL ARTERY COMPRESSION DEVICE: Brand: TR BAND

## (undated) DEVICE — MODEL BT2000 P/N 700287-012KIT CONTENTS: MANIFOLD WITH SALINE AND CONTRAST PORTS, SALINE TUBING WITH SPIKE AND HAND SYRINGE, TRANSDUCER: Brand: BT2000 AUTOMATED MANIFOLD KIT

## (undated) DEVICE — CATH IMG IVUS EAGLE EYE RAPDXNG PLAT SHT/TP 5F .014IN

## (undated) DEVICE — RADIFOCUS OPTITORQUE ANGIOGRAPHIC CATHETER: Brand: OPTITORQUE

## (undated) DEVICE — NC TREK CORONARY DILATATION CATHETER 2.5 MM X 15 MM / RAPID-EXCHANGE: Brand: NC TREK

## (undated) DEVICE — CATH GUIDE LAUNCHER EBU3.5 6F 100CM

## (undated) DEVICE — INTRO SHEATH ART/FEM ENGAGE .038 6F12CM

## (undated) DEVICE — PK CATH LAB 60

## (undated) DEVICE — ELECTRODE,RT,STRESS,FOAM,50PK: Brand: MEDLINE

## (undated) DEVICE — KT INTRO MINISTICK MAX W/GW NITNL/TUNG ECHO 4F 21G 7CM

## (undated) DEVICE — Device: Brand: ASAHI SION BLUE

## (undated) DEVICE — TREK CORONARY DILATATION CATHETER 3.0 MM X 15 MM / RAPID-EXCHANGE: Brand: TREK